# Patient Record
Sex: FEMALE | Race: WHITE | Employment: OTHER | URBAN - NONMETROPOLITAN AREA
[De-identification: names, ages, dates, MRNs, and addresses within clinical notes are randomized per-mention and may not be internally consistent; named-entity substitution may affect disease eponyms.]

---

## 2022-09-06 ENCOUNTER — HOSPITAL ENCOUNTER (OUTPATIENT)
Dept: PSYCHIATRY | Age: 57
Setting detail: THERAPIES SERIES
Discharge: HOME | End: 2022-09-06
Payer: MEDICARE

## 2022-09-06 PROCEDURE — 90791 PSYCH DIAGNOSTIC EVALUATION: CPT

## 2022-09-06 PROCEDURE — 80305 DRUG TEST PRSMV DIR OPT OBS: CPT

## 2022-09-06 RX ORDER — HYDROXYZINE PAMOATE 25 MG/1
25 CAPSULE ORAL 4 TIMES DAILY
COMMUNITY

## 2022-09-06 RX ORDER — FLUOXETINE HYDROCHLORIDE 20 MG/5ML
LIQUID ORAL DAILY
COMMUNITY

## 2022-09-06 ASSESSMENT — ANXIETY QUESTIONNAIRES
3. WORRYING TOO MUCH ABOUT DIFFERENT THINGS: 0
1. FEELING NERVOUS, ANXIOUS, OR ON EDGE: 0
GAD7 TOTAL SCORE: 1
2. NOT BEING ABLE TO STOP OR CONTROL WORRYING: 0
5. BEING SO RESTLESS THAT IT IS HARD TO SIT STILL: 1
6. BECOMING EASILY ANNOYED OR IRRITABLE: 0
7. FEELING AFRAID AS IF SOMETHING AWFUL MIGHT HAPPEN: 0
IF YOU CHECKED OFF ANY PROBLEMS ON THIS QUESTIONNAIRE, HOW DIFFICULT HAVE THESE PROBLEMS MADE IT FOR YOU TO DO YOUR WORK, TAKE CARE OF THINGS AT HOME, OR GET ALONG WITH OTHER PEOPLE: NOT DIFFICULT AT ALL
4. TROUBLE RELAXING: 0

## 2022-09-06 NOTE — PROGRESS NOTES
[facility] EMRE                Progress Note    [] Juliocesar  [x] Hilda Floyd                    Patient Name: Venessa Eddy   : 1965     Case # :  [id number]  Therapist: Puma Rivas        Objective/Service/Time:    Asmt 1 hour, UDS . 28  S- Client reports former [facility] resident, referred by Pee, recently discharged from [facility] due to Possession issues due to Meth/Amphetamine, denies current abuse. Client history of Alcohol abuse denies use for 7 years (see in Epic). O- Client cooperative reported Heart disease and a Catarak.   Denies any S/I admits generalized anxiety has medication  A- Client signed DAVID, gave Urine Screen and started Assessment  P- Client attends Pee, agrees to level 1 was given information on GT, treatment planning                   Kari Davis MA, GAUDENCIO Bartholomew, MAC 22, 2:45 PM

## 2022-09-06 NOTE — PROGRESS NOTES
[address] [place] [address] Urinalysis Laboratory Testing and Medical History Physician Order       Location: [] [place] [x] Bobbye Guadalajara D. Henrene Apgar, MD., [id number], Medical Director of [facility] SURGICAL [facility] Director orders for [address] [place] [address] clinical therapists to collect an urine sample from the below patient,  and medical order for placement in level of care documented on  [facility] 157 scanned order. Client: Nolvia Del Rosario   : 1965   Case# [id number]    Urine sample will be collected following the collections guidelines provided on Clinical Reference Laboratory [facility] [place]) custody form, and completion of the [facility] Non-Federal chain of custody drug screening form. During the course of treatment, randomly a urine sample will be collected, at a minimum of one time a month, more frequently as needed, as part of the clinical outpatient alcohol and drug treatment program at [address] [place] [address]. Medical care recommendation for clients experiencing/reporting  medical concerns, who do not have a family physician and willing to attend  medical care will be assisted in seeking medical care. Clinical providers will refer clients to local family physicians practices as part of the  clients treatment plan and assist the client in gaining access to an appointment. Release of information will be requested to support the  clients seeking medical care. Summary of Medical History  Prior to Admission medications    Medication Sig Start Date End Date Taking? Authorizing Provider   FLUoxetine (PROZAC) 20 MG/5ML solution Take by mouth daily   Yes Historical Provider, MD   hydrOXYzine pamoate (VISTARIL) 25 MG capsule Take 25 mg by mouth 4 times daily   Yes Historical Provider, MD     History reviewed. No pertinent surgical history. Past Medical History:   Diagnosis Date    Heart disease      There are no problems to display for this patient.       Jn [facility] [place]  2022/1:58 PM

## 2022-09-06 NOTE — PROGRESS NOTES
Left message for patient to call back. [facility]                     CLINICAL DIAGNOSIS SUMMARY    Location: [] [place] [x] [place]                   Patient Name: Esteban Whitney   : 1965     Case # :  [id number]  Therapist: Isaac Fish [facility]      Identifying information:  Esteban Whitney / 1965         WSF, 2 Adult children, moved to \[facility]\""    2. Substance use history:  F10.20 Other and unspecified alcohol dependence/unspecified drinking behavior and F15.20 Amphetamine and other psychostimulant dependence-unspecified use       Alcohol abuse, Teenage to 7 Years ago, Vodka high tolerance, till impariment, 5 OVIS, 2champ, [place], Andre 5 total.       Methamphetamine 1 time a month ,     3. Consequences of substance use: (personal, inter-personal, legal, occupational, medical, nutritional,       Leisure, spiritual, etc.)                5 ENMANUEL's,  3 Drug possessions at [facility] [employer], last ,  [employer] in        4. Co-existing problems;  (mental health, psychiatric, previous treatment programs, family       Problems, social, educational, etc.)         Generalized Anxiety,  at [facility], Dr. El Cooper     5. Treatment needs, barriers to treatment, impact of disease on life:       Client has health problesm    Summary of Medical History:  Prior to Admission medications    Medication Sig Start Date End Date Taking? Authorizing Provider   FLUoxetine (PROZAC) 20 MG/5ML solution Take by mouth daily   Yes Historical Provider, MD   hydrOXYzine pamoate (VISTARIL) 25 MG capsule Take 25 mg by mouth 4 times daily   Yes Historical Provider, MD     History reviewed. No pertinent surgical history. Past Medical History:   Diagnosis Date    Heart disease      There are no problems to display for this patient. 6. Level of Care Determination:      1 Outpatient Services   7. Treatment available      __x__yes   _____no         8.   Name of program referred:    ___x_[facility], ____[facility],       _______other/identify     Electronically signed by Pia Orourke LPC on 9/6/2022 at 2:29 PM

## 2022-09-13 ENCOUNTER — HOSPITAL ENCOUNTER (OUTPATIENT)
Dept: PSYCHIATRY | Age: 57
Setting detail: THERAPIES SERIES
Discharge: HOME | End: 2022-09-13
Payer: MEDICARE

## 2022-09-13 PROCEDURE — 90834 PSYTX W PT 45 MINUTES: CPT

## 2022-09-13 NOTE — PROGRESS NOTES
[facility] TREATMENT PLAN      Location: [] [place] [x] [place]    Treatment plan: Initial    Strengths: [place] Mother and grandmother, gardening    Weakness/Limitations: Alcohol, Drug Abuse, and Legal issues    Service/Frequency/Duration: Individual 1wk and Group assigned 1wk Random UDS in 90 days    Diagnosis: F10.20 Other and unspecified alcohol dependence/unspecified drinking behavior and F15.20 Amphetamine and other psychostimulant dependence-unspecified use    Level of Care: 1 Outpatient Services    Problem:  Client history of Alcohol Abuse, 5 ENMANUEL's (sober 7 years), Abused Methamphetamines, Treatment at [facility] in 2021    Goal: Client maintain sober living in the [facility] [place] [address] in 90 days  Objectives:   1) Discussed 2 triggers and 2 cravings to abuse drugs and alcohol in 90 days  Evaluation Date: 12/13/22 Code: C Continue TBD  2) Client discussed 2 benefits of attending the Recovery Zone in 90 days  Evaluation Date: 12/13/22 Code: C Continue TBD      2. Problem: Client reports challenges with Generalized anxiety, peer pressures to use, and finding food and clothing   Goal: Client develop resources to cope with emotions, social pressure and finding food/clothing in 90 days   Objectives:   1) Client report 2 benefits of supports from the [employer]  and the Jaimie program in 90 days  Evaluation Date: 12/13/22 Code: C Continue TBD   2) Client report 2 benefits of , [facility] days  Evaluation Date: 12/13/22 Code: C Continue TBD   3) Client report 2 coping tools to manage emotions in 90 days  Evaluation Date: 12/13/22 Code: C Continue TBD     3. Problem: Client previous 3 drug possession charges in [facility] prior to placement at [facility] in 2021   Goal: Meet the expectations of J-Rig Program and [facility] [place]. In 90 days    Objectives:   1) Client reports 2 benefits of the J-Rig Program and [facility] ([facility]).  In 90 days Evaluation Date: 12/13/22 Code: C Continue TBD      Defer: Explore part time work    Discharge Plan/Instructions: Client's goal maintain family relationships, drug free living, increase activities with crafts, gardening and the recovery zone. Ventura Hope / 1965 has participated in the treatment plan development outlined above on 9/13/2022.      Trinidad [facility]  9/13/2022/2:17 PM

## 2022-09-20 ENCOUNTER — HOSPITAL ENCOUNTER (OUTPATIENT)
Dept: PSYCHIATRY | Age: 57
Setting detail: THERAPIES SERIES
Discharge: HOME | End: 2022-09-20
Payer: MEDICARE

## 2022-09-20 PROCEDURE — 90834 PSYTX W PT 45 MINUTES: CPT

## 2022-09-20 NOTE — PROGRESS NOTES
IT                                                         [facility] EMRE                Progress Note    [] Juliocesar  [x] Kirby Puls                    Patient Name: Filiberto Wilson   : 1965     Case # :  [id number]  Therapist: Bari Estevez [facility]        Objective/Service/Time:    IT 45 minutes  Topic:  maintaining sober living  coping tools per Help guide 3/1 correspondence with her PO and Pee  S- Client reported brother in law  unexpected. Client has been with her sister and her family. Client connecting with a friend name sis. Client interested in cooking, working out, working outside and connecting with her grandchildren. O- Client oriented, focused and engaged in the session  A- Client and this writer discussed resources such as emotional intelligence on [employer], Recovery Zone and the [facility]. P- Client meeting with Pee, called her PO, going to walk and cook. Client enjoys reading and wants to work out.                   Bari Estevez MA, JORI, LSW, MAC 22, 3:03 PM

## 2022-09-20 NOTE — PROGRESS NOTES
[facility]                Progress Note    [] Juliocesar  [x] [place]                    Patient Name: Ventura Hope   : 1965     Case # :  [id number]  Therapist: Trinidad [facility]        Objective/Service/Time:    Jake Licona MA, LPC, LSW, MAC 22, 2:01 PM

## 2022-09-20 NOTE — PROGRESS NOTES
[facility]                Progress Note    [] Juliocesar  [x] [place]                    Patient Name: Ellie Carmona   : 1965     Case # :  [id number]  Therapist: Juan Jose Mccartney [facility]        Objective/Service/Time:    Anita Waters MA, JORI, PETRAW, MAC 22, 2:29 PM

## 2022-09-22 ENCOUNTER — HOSPITAL ENCOUNTER (OUTPATIENT)
Dept: PSYCHIATRY | Age: 57
Setting detail: THERAPIES SERIES
Discharge: HOME | End: 2022-09-22
Payer: MEDICARE

## 2022-09-22 PROCEDURE — 90853 GROUP PSYCHOTHERAPY: CPT

## 2022-09-27 ENCOUNTER — HOSPITAL ENCOUNTER (OUTPATIENT)
Dept: PSYCHIATRY | Age: 57
Setting detail: THERAPIES SERIES
Discharge: HOME | End: 2022-09-27
Payer: MEDICARE

## 2022-09-27 PROCEDURE — 90834 PSYTX W PT 45 MINUTES: CPT

## 2022-09-27 NOTE — PROGRESS NOTES
[facility]                Progress Note    [] [place]  [x] [place]                    Patient Name: Leanna Mcgowan   : 1965     Case # :  [id number]  [facility]        Objective/Service/Time:    IT  60Minutes  requesting Tele-health due to illness  The Client stated their name and soc #. Client agreed to Telehealth and reported in a Confidential setting. Client and counselor will call back if there is a disconnect. Client has the crisis # for  needs. S- Client working on plan , Client was lonely and went to a party and abused Meth on Friday and Sat. Client called her PO, and J-Rig & shared. Client reports triggers are anxiety, Client used alcohol and prescribed Xanax in the past. Client reports began Meth to calm her anxiety. [facility]. Client reports history of fear, but still goes to stores. Client exception to the rule her friend's house and her sons. 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 P- Client follow up with Medical, therapy on , using strengths and diversion.                   Cate Eldridge MA, LPC, LSW, MAC 22, 1:58 PM

## 2022-09-29 ENCOUNTER — HOSPITAL ENCOUNTER (OUTPATIENT)
Dept: PSYCHIATRY | Age: 57
Setting detail: THERAPIES SERIES
Discharge: HOME | End: 2022-09-29
Payer: MEDICARE

## 2022-09-29 NOTE — GROUP NOTE
[facility] Group Therapy Note      9/29/2022    Location:  [facility]    Clients Presents: [phone]    Clients Absent: [phone]    Length of session: 1.5 hours    Group Note: OP    Group Type: Co-Ed    New members were welcomed and introduced. Norms and expectations of group were discussed.     Content: client discussed personal anchors for sober living, serotonin syndrom and abusing Nyquil, Client supported each other during the session    Puma Son  9/29/2022 12:50 PM    Co-Therapist: N/A      [facility] Individual Group Progress Note    Sonya Husbands  1965  9/29/2022    Notes on Client Progress in Group    Reason for Absence: CXL no ride    Puma Son  9/29/2022 12:56 PM    Co-Therapist: N/A

## 2022-10-04 ENCOUNTER — HOSPITAL ENCOUNTER (OUTPATIENT)
Dept: PSYCHIATRY | Age: 57
Setting detail: THERAPIES SERIES
Discharge: HOME | End: 2022-10-04
Payer: MEDICARE

## 2022-10-04 PROCEDURE — 90834 PSYTX W PT 45 MINUTES: CPT

## 2022-10-04 NOTE — PROGRESS NOTES
[facility]                Progress Note    [] Juliocesar  [x] Mike Houston                    Patient Name: Lambert Weston   : 1965     Case # :  [id number]  Therapist: Martin Hook [facility]        Objective/Service/Time:    IT 1.0  Topic: coping skills, [employer], utilizing her strengths  S- Client reports, tired, has not used 1 week. Client planning to discuss anxiety with [facility], and planning a time with her son. O- Client full range, sad to bright  A- Client this writer focused on her strengths and what the client can do, bake, Y, serve, and attend recovery zone. 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 P- Client use resources as noted above.                    Martin Hook MA, JORI, LSW, MAC 10/04/22, 2:42 PM

## 2022-10-06 ENCOUNTER — HOSPITAL ENCOUNTER (OUTPATIENT)
Dept: PSYCHIATRY | Age: 57
Setting detail: THERAPIES SERIES
Discharge: HOME | End: 2022-10-06
Payer: MEDICARE

## 2022-10-06 PROCEDURE — 90853 GROUP PSYCHOTHERAPY: CPT

## 2022-10-06 NOTE — GROUP NOTE
[facility] Group Therapy Note      10/6/2022    Location:  [employer]    Clients Presents: [phone]    Clients Absent: [phone]    Length of session: 1.5 hours    Group Note: OP    Group Type: Co-Ed    New members were welcomed and introduced. Norms and expectations of group were discussed. Content: Client responded and discussed their Values and strengths as tools to motivate, to focus and to enable sober living. Client's viewed and discussed Tools video from [employer] and Father Brittny Perez step 3. Clients were advised of varying resources to gain sober living: AA and [employer] resources. Morro [facility]  10/6/2022 1:11 PM    Co-Therapist: N/A      [facility] Individual Group Progress Note    Doyle Childers  1965  10/6/2022    Notes on Client Progress in Group    Client shared about her lapse and use. Client gained peer support. Client using vitamins, scheduled for an assessment with ANUJ Li, spending time with friends and Family. Client hopes to go to the [facility], spend time with grandchildren and find things to do with her time.     Morro [facility]  10/6/2022 1:23 PM    Co-Therapist: N/A

## 2022-10-11 ENCOUNTER — HOSPITAL ENCOUNTER (OUTPATIENT)
Dept: PSYCHIATRY | Age: 57
Setting detail: THERAPIES SERIES
Discharge: HOME | End: 2022-10-11
Payer: MEDICARE

## 2022-10-11 PROCEDURE — 80305 DRUG TEST PRSMV DIR OPT OBS: CPT

## 2022-10-11 PROCEDURE — 90834 PSYTX W PT 45 MINUTES: CPT

## 2022-10-11 NOTE — PROGRESS NOTES
[facility]                Progress Note    [] Juliocesar  [x] Elton Sawant                    Patient Name: Leeroy Bland   : 1965     Case # :  [id number]  [facility]        Objective/Service/Time:    IT      1.0 UDS . 28  S- Client reports, doing much better, no anxiety, no use, Client spending time with grandchildren, and friend. Client planning to go hiking. O- Client focused, oriented, and bright affect, advised of new PO, sign DAVID  A- Client and this writer discussed her improved mental health and motivation. Client focused willingly and wants to increase her activities. P- Client aware of next IT in 2 Weeks and GT on 10/20/22.                       Jennifer Dickinson MA, JORI, LSW, MAC 10/11/22, 1:47 PM

## 2022-10-20 ENCOUNTER — HOSPITAL ENCOUNTER (OUTPATIENT)
Dept: PSYCHIATRY | Age: 57
Setting detail: THERAPIES SERIES
Discharge: HOME | End: 2022-10-20
Payer: MEDICARE

## 2022-10-20 ENCOUNTER — HOSPITAL ENCOUNTER (OUTPATIENT)
Dept: PSYCHIATRY | Age: 57
Setting detail: THERAPIES SERIES
End: 2022-10-20
Payer: MEDICARE

## 2022-10-20 PROCEDURE — 90853 GROUP PSYCHOTHERAPY: CPT | Performed by: COUNSELOR

## 2022-10-20 NOTE — GROUP NOTE
612 [facility] Group Therapy Note      10/20/2022    Location:  [facility]    Clients Presents: [phone], [address] [facility] [address] [facility], 5089    Clients Absent: [phone], [phone], [phone]    Length of session: 1.5 hours    Group Note: OP    Group Type: Co-Ed    New members were welcomed and introduced. Norms and expectations of group were discussed. Content: Group members shared their check-in sheet, reviewed history of the situation that brought them into treatment, length of time of their sobriety. Completed Positive Sobert support worksheet and reviewed with group     RICCO Newell-MITCH  14/60/9766 11:53 AM    Co-Therapist: N/A      [facility] Individual Group Progress Note    Dioni Augustine  1965  10/20/2022    Notes on Client Progress in Group    Reports about a month of sobriety with a recent slip.   States avoiding all using friends, block from social media, has several family members and close friends for sober support      Lev Newell  95/63/3498 12:07 PM    Co-Therapist: N/A

## 2022-10-25 ENCOUNTER — HOSPITAL ENCOUNTER (OUTPATIENT)
Dept: PSYCHIATRY | Age: 57
Setting detail: THERAPIES SERIES
Discharge: HOME | End: 2022-10-25
Payer: MEDICARE

## 2022-10-25 ENCOUNTER — HOSPITAL ENCOUNTER (OUTPATIENT)
Dept: PSYCHIATRY | Age: 57
Setting detail: THERAPIES SERIES
End: 2022-10-25
Payer: MEDICARE

## 2022-10-25 PROCEDURE — 90832 PSYTX W PT 30 MINUTES: CPT | Performed by: COUNSELOR

## 2022-10-25 NOTE — PROGRESS NOTES
[facility] TREATMENT PLAN      Location: [] [place] [x] Christ Preciado    Treatment plan: Initial    Strengths: Bri[place] Mother and grandmother, gardening    Weakness/Limitations: Alcohol, Drug Abuse, and Legal issues    Service/Frequency/Duration: Individual 1wk and Group assigned 1wk Random UDS in 90 days    Diagnosis: F10.20 Other and unspecified alcohol dependence/unspecified drinking behavior and F15.20 Amphetamine and other psychostimulant dependence-unspecified use    Level of Care: 1 Outpatient Services    Problem:  Client history of Alcohol Abuse, 5 ENMANUEL's (sober 7 years), Abused Methamphetamines, Treatment at [facility] in 2021    Goal: Client maintain sober living in the [place] [address] in 90 days  Objectives:   1) Discussed 2 triggers and 2 cravings to abuse drugs and alcohol in 90 days  Evaluation Date: 12/13/22 Code: C Continue TBD            ACHIEVED  10-25-22   Using people who have drugs, past thought could use just once, couldn't report any other triggers, reports             Using because every once else was  2) Client discussed 2 benefits of attending the Recovery Zone in 90 days  Evaluation Date: 12/13/22 Code: C Continue TBD           * Reports not interested in Recovery Zone, past years attended AA, close friend will go to [employer] on occasion and she will attend      2.     Problem: Client reports challenges with Generalized anxiety, peer pressures to use, and finding food and clothing   Goal: Client develop resources to cope with emotions, social pressure and finding food/clothing in 90 days   Objectives:   1) Client report 2 benefits of supports from the [facility] and the Iliana Short program in 90 days  Evaluation Date: 12/13/22 Code: C Continue TBD   2) Client report 2 benefits of , Medical support from Dr. Beti Duckworth in 90 days  Evaluation Date: 12/13/22 Code: C Continue TBD               3) Client report 2 coping tools to manage emotions in 90 days  Evaluation Date: 12/13/22 Code: C Continue TBD     3. Problem: Client previous 3 drug possession charges in [facility] prior to placement at [facility] in 2021   Goal: Meet the expectations of JRig Program and [facility] [place] [place]. In 90 days    Objectives:   1) Client reports 2 benefits of the J-Rig Program and [facility] In 90 days  Evaluation Date: 12/13/22 Code: C Continue TBD      Defer: Explore part time work    Discharge Plan/Instructions: Client's goal maintain family relationships, drug free living, increase activities with crafts, gardening and the recovery zone. Annika Cali / 1965 has participated in the treatment plan development outlined above on 10/25/2022.      RICCO Millan-MITCH  57/16/1316/1:21 PM

## 2022-10-25 NOTE — PROGRESS NOTES
612 [facility]        Individual  Progress Note    Location: [] [place] [x] Hollie Wu                   Patient Name: Taran Atkins   : 1965     Case # :  [id number]  Therapist: FLETCHER Erwin        Objective/Service/Time: kept 30 minutes    S  Client reports doing well, having no triggers, no stresses, everything going well  O verbal, open  A Client reports not attending Recovery Zone, states not a group person, finds support in family and a  Close friend. 1 ob 1  reports limited triggers, states being around other people using drugs is her primary trigger, and couldn't  Report any other ones, states avoiding all using friends, and no alcohol use for many years.     Will have cataract eye surgery  to continue treatment next week with Paul Nash therapist         Electronically signed by FLETCHER Erwin, FLETCHER HSIEH,  on  at 2:38 PM

## 2022-10-27 ENCOUNTER — HOSPITAL ENCOUNTER (OUTPATIENT)
Dept: PSYCHIATRY | Age: 57
Setting detail: THERAPIES SERIES
Discharge: HOME | End: 2022-10-27
Payer: MEDICARE

## 2022-10-27 ENCOUNTER — HOSPITAL ENCOUNTER (OUTPATIENT)
Dept: PSYCHIATRY | Age: 57
Setting detail: THERAPIES SERIES
End: 2022-10-27
Payer: MEDICARE

## 2022-10-27 NOTE — GROUP NOTE
[facility] Group Therapy Note      10/27/2022    Location:  [place] Manri[place]    Clients Presents: [phone], [phone]    Clients Absent: 25 [facility], [address] [facility], [address] [place] [address], 92 [facility], 5094, 5113, 6895, 6613    Length of session: 1.5 hours    Group Note: OP    Group Type: Co-Ed    New members were welcomed and introduced. Norms and expectations of group were discussed. Content: Group members discussed their check-in sheet, shared history of the situation that brought them into treatment and discussed positive supports and alternative activities. Discussed nicotine addiction and tools for cessation.       [place], [place]  10/27/2022 2:18 PM    Co-Therapist: N/A      [facility] Individual Group Progress Note    Margarita Calloway  1965  10/27/2022    Notes on Client Progress in Group    Reason for Absence: [address] [place] [address]  10/27/2022 2:35 PM    Co-Therapist: N/A

## 2022-11-01 ENCOUNTER — HOSPITAL ENCOUNTER (OUTPATIENT)
Dept: PSYCHIATRY | Age: 57
Setting detail: THERAPIES SERIES
Discharge: HOME | End: 2022-11-01
Payer: MEDICARE

## 2022-11-01 PROCEDURE — 90832 PSYTX W PT 30 MINUTES: CPT

## 2022-11-01 NOTE — PROGRESS NOTES
[facility]                Progress Note    [] [place]  [x] [place]                    Patient Name: Keven Salmeron   : 1965     Case # :  [id number]  Therapist: Juan Luis Barker [facility]        Objective/Service/Time:    IT Telehealth , 30 minutes,  illness was in contact with friends who have COVID The Client stated their name and soc #. Client agreed to Telehealth and reported in a Confidential setting. Client and counselor will call back if there is a disconnect. Client has the crisis # for  needs. S- Client reports,   doing well with sober living and hanging with peers who are supportive & sober. Client fighting an illness and did not want to \"spread the illness\". Client has attended the Y, confusion on using the passes. Client checks in with J-Rig  O- Client focused, cooperative and motivated per her report  A- Client and this writer discussed her motivation, the benefits of recovery zone and the [employer].   P- Client plans to attend GT, IT, and J-Rig                  Juan Luis Barker MA, [facility], GAUDENCIO, MAC 22, 1:59 PM

## 2022-11-03 ENCOUNTER — HOSPITAL ENCOUNTER (OUTPATIENT)
Dept: PSYCHIATRY | Age: 57
Setting detail: THERAPIES SERIES
Discharge: HOME | End: 2022-11-03
Payer: MEDICARE

## 2022-11-03 PROCEDURE — 90853 GROUP PSYCHOTHERAPY: CPT

## 2022-11-03 NOTE — GROUP NOTE
612 [facility] Group Therapy Note      11/3/2022    Location:  [facility]    Clients Presents: [phone]    Clients Absent: [phone]    Length of session: 1.5 hours    Group Note: OP    Group Type: Co-Ed    New members were welcomed and introduced. Norms and expectations of group were discussed. Content: Client's shared their strengths in reference to sober living, Client watched and discussed Deborra Saucer Sober story.     [facility]  11/3/2022 12:14 PM    Co-Therapist: N/A      [facility] Individual Group Progress Note    Felylatoya Atkins  1965  11/3/2022    Notes on Client Progress in Group    Client shared freely, motivated and focused told her story    [facility]  11/3/2022 12:24 PM    Co-Therapist: N/A

## 2022-11-08 ENCOUNTER — ANESTHESIA EVENT (OUTPATIENT)
Dept: OPERATING ROOM | Age: 57
End: 2022-11-08
Payer: MEDICARE

## 2022-11-08 NOTE — ANESTHESIA PRE PROCEDURE
Department of Anesthesiology  Preprocedure Note       Name:  Annika Cali   Age:  59 y.o.  :  1965                                          MRN:  [id number]         Date:  2022      Surgeon: Julian Fuller):  Jayla Amaral MD    Procedure: Procedure(s):  RIGHT EYE CATARACT EXTRACTION    Medications prior to admission:   Prior to Admission medications    Medication Sig Start Date End Date Taking? Authorizing Provider   FLUoxetine (PROZAC) 20 MG/5ML solution Take by mouth daily    Historical Provider, MD   hydrOXYzine pamoate (VISTARIL) 25 MG capsule Take 25 mg by mouth 4 times daily    Historical Provider, MD       Current medications:    No current facility-administered medications for this encounter. Current Outpatient Medications   Medication Sig Dispense Refill    FLUoxetine (PROZAC) 20 MG/5ML solution Take by mouth daily      hydrOXYzine pamoate (VISTARIL) 25 MG capsule Take 25 mg by mouth 4 times daily         Allergies:  No Known Allergies    Problem List:  There is no problem list on file for this patient. Past Medical History:        Diagnosis Date    Heart disease        Past Surgical History:  History reviewed. No pertinent surgical history. Social History:    Social History     Tobacco Use    Smoking status: Not on file    Smokeless tobacco: Not on file   Substance Use Topics    Alcohol use: Not on file                                Counseling given: Not Answered      Vital Signs (Current): There were no vitals filed for this visit.                                            BP Readings from Last 3 Encounters:   No data found for BP       NPO Status:                                                                                 BMI:   Wt Readings from Last 3 Encounters:   No data found for Wt     There is no height or weight on file to calculate BMI.    CBC: No results found for: WBC, RBC, HGB, HCT, MCV, RDW, PLT    CMP: No results found for: NA, K, CL, CO2, BUN, CREATININE, GFRAA, AGRATIO, LABGLOM, GLUCOSE, GLU, PROT, CALCIUM, BILITOT, ALKPHOS, AST, ALT    POC Tests: No results for input(s): POCGLU, POCNA, POCK, POCCL, POCBUN, POCHEMO, POCHCT in the last 72 hours. Coags: No results found for: PROTIME, INR, APTT    HCG (If Applicable): No results found for: PREGTESTUR, PREGSERUM, HCG, HCGQUANT     ABGs: No results found for: PHART, PO2ART, JBA4UIB, EAF3AZR, BEART, T2IBMYUH     Type & Screen (If Applicable):  No results found for: LABABO, LABRH    Drug/Infectious Status (If Applicable):  No results found for: HIV, HEPCAB    COVID-19 Screening (If Applicable): No results found for: COVID19        Anesthesia Evaluation  Patient summary reviewed no history of anesthetic complications:   Airway: Mallampati: II  TM distance: >3 FB   Neck ROM: full  Mouth opening: > = 3 FB   Dental:      Comment: Upper partial     Pulmonary:normal exam    (+) current smoker                           Cardiovascular:                      Neuro/Psych:   (+) psychiatric history:depression/anxiety             GI/Hepatic/Renal:        (-) GERD       Endo/Other: Negative Endo/Other ROS                    Abdominal:             Vascular: Other Findings:           Anesthesia Plan      MAC     ASA 2     (Chart review 11/8/22)  Induction: intravenous. Anesthetic plan and risks discussed with patient.                         FABIAN Hayward CRNA   11/8/2022

## 2022-11-10 ENCOUNTER — HOSPITAL ENCOUNTER (OUTPATIENT)
Dept: PSYCHIATRY | Age: 57
Setting detail: THERAPIES SERIES
Discharge: HOME | End: 2022-11-10
Payer: MEDICARE

## 2022-11-10 PROCEDURE — 90853 GROUP PSYCHOTHERAPY: CPT

## 2022-11-10 NOTE — GROUP NOTE
[facility] Group Therapy Note      11/10/2022    Location:  [facility]    Clients Presents: [phone]    Clients Absent: [phone]    Length of session: 1.5 hours    Group Note: OP    Group Type: Co-Ed    New members were welcomed and introduced. Norms and expectations of group were discussed. Content: Client's discussed boundaries, transitions, coping with idle time, finding work and how they stay sober     Izabela Bajwa [facility]  11/10/2022 12:40 PM    Co-Therapist: [facility] Progress Note    Gloria Jang  1965  11/10/2022    Notes on Client Progress in Group    Client bright, rosser, working on wellness, gave feedback to peer about her sober journey.     Izabela Bajwa [facility]  11/10/2022 12:50 PM    Co-Therapist: N/A

## 2022-11-15 ENCOUNTER — HOSPITAL ENCOUNTER (OUTPATIENT)
Dept: PSYCHIATRY | Age: 57
Setting detail: THERAPIES SERIES
Discharge: HOME | End: 2022-11-15
Payer: MEDICARE

## 2022-11-15 PROCEDURE — 90834 PSYTX W PT 45 MINUTES: CPT

## 2022-11-15 PROCEDURE — 80305 DRUG TEST PRSMV DIR OPT OBS: CPT

## 2022-11-15 NOTE — PROGRESS NOTES
Surgery Date:  11/18/22                                  Arrive at: 0700  Surgery time: 0900     If your arrival time is before 7 AM come in the emergency room entrance. If after 7 AM come in the main entrance. Two visitors may accompany you to the hospital.              1. Do not eat or drink anything after midnight - unless instructed by your doctor prior to surgery. This includes                  no water, chewing gum or mints. 2. Follow your directions as prescribed by the doctor for your procedure and medications. Take the following medications with a small sip of water the morning of: none               3. Check with your Doctor regarding stopping Plavix, Coumadin, Lovenox, Effient, Pradaxa, Xarelto, Fragmin or other blood thinners and                  follow their instructions. 4. Do not smoke and do not drink any alcoholic beverages 24 hours prior to surgery. 5. You may brush your teeth and gargle the morning of surgery. DO NOT SWALLOW WATER  6. Please wear simple, loose fitting clothing to the hospital.  [place] not bring valuables (money, credit cards, checkbooks, etc.) Do not wear any                  makeup (including no eye makeup) or nail polish on your fingers or toes. 7.  DO NOT wear any jewelry or piercings on day of surgery. All body piercing jewelry must be removed. 8.  Take a shower the night before or morning of your procedure, do not apply any lotion, oil or powder. 9. If you have dentures, they will be removed before going to the OR; we will provide you a container. If you wear contact lenses or glasses,                 they will be removed; please bring a case for them. 10. If you  have a Living Will and Durable Power of  for Healthcare, please bring in a copy.            11. Please bring picture ID,  insurance card, paperwork from the doctors office    (H & P, Consent, & card for implantable devices). 12. You MUST make arrangements for a responsible adult to take you home after your surgery and be able to check on you every couple                  hours for the day. You will not be allowed to leave alone or drive yourself home. It is strongly suggested someone stay with you the first 24                  hrs. Your surgery will be cancelled if you do not have a ride home. Please call [phone] with any questions.

## 2022-11-17 ENCOUNTER — HOSPITAL ENCOUNTER (OUTPATIENT)
Dept: PSYCHIATRY | Age: 57
Setting detail: THERAPIES SERIES
Discharge: HOME | End: 2022-11-17
Payer: MEDICARE

## 2022-11-17 PROCEDURE — 90853 GROUP PSYCHOTHERAPY: CPT

## 2022-11-17 NOTE — GROUP NOTE
[facility] Group Therapy Note      11/17/2022    Location:  [facility]    Clients Presents: [phone]     Clients Absent: [phone]    Length of session: 1.5 hours    Group Note: OP    Group Type: Co-Ed    New members were welcomed and introduced. Norms and expectations of group were discussed. Content: Client's discussed [employer]  Backsliding Video and DEADS handout coping with urges. Client gave peer feedback to a member who is in crisis . [facility]  11/17/2022 11:53 AM    Co-Therapist: N/A      [facility] Progress Note    Mary Alice Kam  1965  11/17/2022    Notes on Client Progress in Group    Client excited about her eye surgery. Client shared how she battled lapses and setting boundaries with others. Client gave feedback to group member.     [facility]  11/17/2022 12:04 PM    Co-Therapist: N/A

## 2022-11-18 ENCOUNTER — HOSPITAL ENCOUNTER (OUTPATIENT)
Age: 57
Setting detail: OUTPATIENT SURGERY
Discharge: HOME | End: 2022-11-18
Attending: OPHTHALMOLOGY | Admitting: OPHTHALMOLOGY
Payer: MEDICARE

## 2022-11-18 ENCOUNTER — ANESTHESIA (OUTPATIENT)
Dept: OPERATING ROOM | Age: 57
End: 2022-11-18
Payer: MEDICARE

## 2022-11-18 VITALS
BODY MASS INDEX: 29.4 KG/M2 | SYSTOLIC BLOOD PRESSURE: 113 MMHG | TEMPERATURE: 98.2 F | RESPIRATION RATE: 16 BRPM | HEIGHT: 68 IN | OXYGEN SATURATION: 100 % | WEIGHT: 194 LBS | HEART RATE: 72 BPM | DIASTOLIC BLOOD PRESSURE: 72 MMHG

## 2022-11-18 PROBLEM — H25.21 MORGAGNIAN AGE-RELATED CATARACT OF RIGHT EYE: Status: ACTIVE | Noted: 2022-11-18

## 2022-11-18 PROCEDURE — 7100000010 HC PHASE II RECOVERY - FIRST 15 MIN: Performed by: OPHTHALMOLOGY

## 2022-11-18 PROCEDURE — 7100000011 HC PHASE II RECOVERY - ADDTL 15 MIN: Performed by: OPHTHALMOLOGY

## 2022-11-18 PROCEDURE — 2580000003 HC RX 258: Performed by: OPHTHALMOLOGY

## 2022-11-18 PROCEDURE — 6360000002 HC RX W HCPCS: Performed by: NURSE ANESTHETIST, CERTIFIED REGISTERED

## 2022-11-18 PROCEDURE — 3600000004 HC SURGERY LEVEL 4 BASE: Performed by: OPHTHALMOLOGY

## 2022-11-18 PROCEDURE — 2500000003 HC RX 250 WO HCPCS: Performed by: OPHTHALMOLOGY

## 2022-11-18 PROCEDURE — 6370000000 HC RX 637 (ALT 250 FOR IP): Performed by: OPHTHALMOLOGY

## 2022-11-18 PROCEDURE — 2709999900 HC NON-CHARGEABLE SUPPLY: Performed by: OPHTHALMOLOGY

## 2022-11-18 PROCEDURE — 3700000001 HC ADD 15 MINUTES (ANESTHESIA): Performed by: OPHTHALMOLOGY

## 2022-11-18 PROCEDURE — 3600000014 HC SURGERY LEVEL 4 ADDTL 15MIN: Performed by: OPHTHALMOLOGY

## 2022-11-18 PROCEDURE — 3700000000 HC ANESTHESIA ATTENDED CARE: Performed by: OPHTHALMOLOGY

## 2022-11-18 PROCEDURE — 2500000003 HC RX 250 WO HCPCS: Performed by: NURSE ANESTHETIST, CERTIFIED REGISTERED

## 2022-11-18 PROCEDURE — V2632 POST CHMBR INTRAOCULAR LENS: HCPCS | Performed by: OPHTHALMOLOGY

## 2022-11-18 PROCEDURE — 6360000002 HC RX W HCPCS: Performed by: OPHTHALMOLOGY

## 2022-11-18 DEVICE — AKREOS AO MICRO INCISION LENS +0.00D
Type: IMPLANTABLE DEVICE | Site: EYE | Status: FUNCTIONAL
Brand: AKREOS® AO IOL

## 2022-11-18 RX ORDER — BETAMETHASONE SODIUM PHOSPHATE AND BETAMETHASONE ACETATE 3; 3 MG/ML; MG/ML
INJECTION, SUSPENSION INTRA-ARTICULAR; INTRALESIONAL; INTRAMUSCULAR; SOFT TISSUE
Status: COMPLETED | OUTPATIENT
Start: 2022-11-18 | End: 2022-11-18

## 2022-11-18 RX ORDER — NEOMYCIN SULFATE, POLYMYXIN B SULFATE, AND DEXAMETHASONE 3.5; 10000; 1 MG/G; [USP'U]/G; MG/G
OINTMENT OPHTHALMIC
Status: COMPLETED | OUTPATIENT
Start: 2022-11-18 | End: 2022-11-18

## 2022-11-18 RX ORDER — LIDOCAINE HYDROCHLORIDE 20 MG/ML
INJECTION, SOLUTION EPIDURAL; INFILTRATION; INTRACAUDAL; PERINEURAL
Status: COMPLETED | OUTPATIENT
Start: 2022-11-18 | End: 2022-11-18

## 2022-11-18 RX ORDER — CEFAZOLIN SODIUM 1 G/3ML
INJECTION, POWDER, FOR SOLUTION INTRAMUSCULAR; INTRAVENOUS
Status: COMPLETED | OUTPATIENT
Start: 2022-11-18 | End: 2022-11-18

## 2022-11-18 RX ORDER — TROPICAMIDE 10 MG/ML
1 SOLUTION/ DROPS OPHTHALMIC SEE ADMIN INSTRUCTIONS
Status: DISCONTINUED | OUTPATIENT
Start: 2022-11-18 | End: 2022-11-18 | Stop reason: HOSPADM

## 2022-11-18 RX ORDER — SODIUM CHLORIDE, SODIUM LACTATE, POTASSIUM CHLORIDE, CALCIUM CHLORIDE 600; 310; 30; 20 MG/100ML; MG/100ML; MG/100ML; MG/100ML
INJECTION, SOLUTION INTRAVENOUS CONTINUOUS PRN
Status: DISCONTINUED | OUTPATIENT
Start: 2022-11-18 | End: 2022-11-18 | Stop reason: SDUPTHER

## 2022-11-18 RX ORDER — MOXIFLOXACIN 5 MG/ML
SOLUTION/ DROPS OPHTHALMIC
Status: COMPLETED | OUTPATIENT
Start: 2022-11-18 | End: 2022-11-18

## 2022-11-18 RX ORDER — SODIUM CHLORIDE, SODIUM LACTATE, POTASSIUM CHLORIDE, CALCIUM CHLORIDE 600; 310; 30; 20 MG/100ML; MG/100ML; MG/100ML; MG/100ML
INJECTION, SOLUTION INTRAVENOUS CONTINUOUS
Status: DISCONTINUED | OUTPATIENT
Start: 2022-11-18 | End: 2022-11-18 | Stop reason: HOSPADM

## 2022-11-18 RX ORDER — TETRACAINE HYDROCHLORIDE 5 MG/ML
SOLUTION OPHTHALMIC
Status: COMPLETED | OUTPATIENT
Start: 2022-11-18 | End: 2022-11-18

## 2022-11-18 RX ORDER — LIDOCAINE HYDROCHLORIDE 20 MG/ML
INJECTION, SOLUTION EPIDURAL; INFILTRATION; INTRACAUDAL; PERINEURAL PRN
Status: DISCONTINUED | OUTPATIENT
Start: 2022-11-18 | End: 2022-11-18 | Stop reason: SDUPTHER

## 2022-11-18 RX ORDER — CYCLOPENTOLATE HYDROCHLORIDE 10 MG/ML
1 SOLUTION/ DROPS OPHTHALMIC SEE ADMIN INSTRUCTIONS
Status: DISCONTINUED | OUTPATIENT
Start: 2022-11-18 | End: 2022-11-18 | Stop reason: HOSPADM

## 2022-11-18 RX ORDER — PHENYLEPHRINE HCL 2.5 %
1 DROPS OPHTHALMIC (EYE) SEE ADMIN INSTRUCTIONS
Status: DISCONTINUED | OUTPATIENT
Start: 2022-11-18 | End: 2022-11-18 | Stop reason: HOSPADM

## 2022-11-18 RX ORDER — PROPOFOL 10 MG/ML
INJECTION, EMULSION INTRAVENOUS PRN
Status: DISCONTINUED | OUTPATIENT
Start: 2022-11-18 | End: 2022-11-18 | Stop reason: SDUPTHER

## 2022-11-18 RX ADMIN — CYCLOPENTOLATE HYDROCHLORIDE 1 DROP: 10 SOLUTION OPHTHALMIC at 07:57

## 2022-11-18 RX ADMIN — TROPICAMIDE 1 DROP: 10 SOLUTION/ DROPS OPHTHALMIC at 07:57

## 2022-11-18 RX ADMIN — PHENYLEPHRINE HYDROCHLORIDE 1 DROP: 25 SOLUTION/ DROPS OPHTHALMIC at 07:57

## 2022-11-18 RX ADMIN — TROPICAMIDE 1 DROP: 10 SOLUTION/ DROPS OPHTHALMIC at 08:12

## 2022-11-18 RX ADMIN — TROPICAMIDE 1 DROP: 10 SOLUTION/ DROPS OPHTHALMIC at 07:24

## 2022-11-18 RX ADMIN — SODIUM CHLORIDE, POTASSIUM CHLORIDE, SODIUM LACTATE AND CALCIUM CHLORIDE: 600; 310; 30; 20 INJECTION, SOLUTION INTRAVENOUS at 07:27

## 2022-11-18 RX ADMIN — SODIUM CHLORIDE, SODIUM LACTATE, POTASSIUM CHLORIDE, CALCIUM CHLORIDE: 600; 310; 30; 20 INJECTION, SOLUTION INTRAVENOUS at 09:07

## 2022-11-18 RX ADMIN — PHENYLEPHRINE HYDROCHLORIDE 1 DROP: 25 SOLUTION/ DROPS OPHTHALMIC at 08:12

## 2022-11-18 RX ADMIN — LIDOCAINE HYDROCHLORIDE 100 MG: 20 INJECTION, SOLUTION EPIDURAL; INFILTRATION; INTRACAUDAL; PERINEURAL at 09:06

## 2022-11-18 RX ADMIN — CYCLOPENTOLATE HYDROCHLORIDE 1 DROP: 10 SOLUTION OPHTHALMIC at 08:12

## 2022-11-18 RX ADMIN — PHENYLEPHRINE HYDROCHLORIDE 1 DROP: 25 SOLUTION/ DROPS OPHTHALMIC at 07:24

## 2022-11-18 RX ADMIN — PROPOFOL 70 MG: 10 INJECTION, EMULSION INTRAVENOUS at 09:06

## 2022-11-18 RX ADMIN — CYCLOPENTOLATE HYDROCHLORIDE 1 DROP: 10 SOLUTION OPHTHALMIC at 07:24

## 2022-11-18 ASSESSMENT — PAIN SCALES - GENERAL: PAINLEVEL_OUTOF10: 0

## 2022-11-18 ASSESSMENT — LIFESTYLE VARIABLES: SMOKING_STATUS: 1

## 2022-11-18 ASSESSMENT — PAIN - FUNCTIONAL ASSESSMENT: PAIN_FUNCTIONAL_ASSESSMENT: 0-10

## 2022-11-18 NOTE — OP NOTE
ACCOUNT NUMBER:  PATIENT NAME: Annika Cali  SURGEON:Gabino Berry MD   [address] [facility], [facility]    OPERATIVE REPORT          DATE OF OPERATION: _11/18/2022_  PREOPERTIVE DIAGNOIS:  MATURE CataractOD eye. POSTOPERTIVE DIAGNOSIS:  MATURE Cataract_OD eye. PROCEDURE:  Phacoemulsification of Cataract with intraocular lens      implant _OD eye.with use of VISION BLUE  ANESTHESIA:  Monitored anesthesia care with 2% Xylocaine in a       retrobullar injection. DESCRIPTION OF PROCEDURE:  Under adequate local anesthesia, a lid speculum was placed in the eye to expose the globe. Conjunctival flap was developed from 10 to 12 oclock position. Hemostasis was obtained using wet-field cautery. A 69-Bever blade was than utilized to make the initial scleral incision to a mid depth. Using a 55/22 blade a shelved incision was made into the anterior chamber. Paracentesis site was made at the 2 oclock position with a 75-Bever blade. VISION BLUE was placed into the anterior chamber to visualize the lens capsule. Healon was instilled and anterior capsuiorrhexis was performed. Hydrodilssection with BSS was carried out. Utilizing the phaco-emulsifier, the phacoemulsification of the cataract was accomplished. Using the irrigation-aspiration unit the remaining cortical material was removed. The intraocular lens was inspected and inserted into the bag with no difficulty after healon was instilled in the anterior chamber. The healon was removed with irrigation-aspiration unit and the wound was inspected to be self-sealing. The conjunctiva flap was then reapproximated with cautery. Sub-Tenon injection of 0.5 ml of Celestone and 0.5 ml Ancef was given. NeoDecadron was placed in the cul-de-sac. The patients eye was patched and shielded. The patient was returned to recovery in satisfactory condition with no complications from surgery or anesthesia.     The patient was discharged on the same day with home-going

## 2022-11-18 NOTE — PROGRESS NOTES
Patient returned to room from [address] [place] [address]. Report received from [facility]. Bed brakes applied and VS obtained. See flow sheet. Patient A/O x4. Drink and snack offered. Dressing to right eye dry/intact with no drainage. Will continue to monitor. Call light in reach.

## 2022-11-18 NOTE — ANESTHESIA POSTPROCEDURE EVALUATION
Department of Anesthesiology  Postprocedure Note    Patient: Ashley Watson  MRN: [id number]  YOB: 1965  Date of evaluation: 11/18/2022      Procedure Summary     Date: 11/18/22 Room / Location: [address] [place] [address] 01 / [facility]    Anesthesia Start: 0900 Anesthesia Stop: 6578    Procedure: RIGHT EYE CATARACT EXTRACTION WITH INTAOCULAR LENS IMPLANT (Right: Eye) Diagnosis:       Other age-related cataract of right eye      (Other age-related cataract of right eye [H25.89])    Surgeons: Katie Spears MD Responsible Provider: FABIAN Melgoza CRNA    Anesthesia Type: MAC ASA Status: 2          Anesthesia Type: MAC    Brennen Phase I:      Brennen Phase II:        Anesthesia Post Evaluation    Patient location during evaluation: bedside  Patient participation: complete - patient participated  Level of consciousness: sleepy but conscious  Pain score: 0  Airway patency: patent  Nausea & Vomiting: no nausea and no vomiting  Complications: no  Cardiovascular status: blood pressure returned to baseline and hemodynamically stable  Respiratory status: acceptable  Hydration status: stable

## 2022-11-18 NOTE — DISCHARGE INSTRUCTIONS
Leave your eye patch/shield on until your follow up appointment with Dr. Marcelina Councilman later today. Avoid any activity that will cause you to strain. No lifting, pushing or pulling greater than 10 lbs until ok'ed by Dr. Marcelina Councilman. Call his office with any questions. [facility] in [address] (Same Day Surgery)    Do not drive, work around [address] or use equipment. Do not drink any alcoholic beverages. Do not smoke while alone. Avoid making important decisions. Plan to spend a quiet, relaxed evening @ home. Resume normal activities as you begin to feel better. Eat lightly for your first meal, then gradually increase your diet to what is normal for you. In case of nausea, avoid food and drink only clear liquids. Resume food as nausea ceases. Notify your surgeon if you experience fever, chills, large amount of bleeding, difficulty breathing, persistent nausea and vomiting or any other disturbing problem. Call for a follow-up appointment with your surgeon. Advance Care Planning  People with COVID-19 may have no symptoms, mild symptoms, such as fever, cough, and shortness of breath or they may have more severe illness, developing severe and fatal pneumonia. As a result, Advance Care Planning with attention to naming a health care decision maker (someone you trust to make healthcare decisions for you if you could not speak for yourself) and sharing other health care preferences is important BEFORE a possible health crisis. Please contact your Primary Care Provider to discuss Advance Care Planning.     Preventing the Spread of Coronavirus Disease 2019 in Homes and Residential Communities  For the most recent information go to [employer]    Prevention steps for People with confirmed or suspected COVID-19 (including persons under investigation) who do not need to be hospitalized  and   People with confirmed COVID-19 who were hospitalized and determined to be medically stable to go home    Your healthcare provider and public health staff will evaluate whether you can be cared for at home. If it is determined that you do not need to be hospitalized and can be isolated at home, you will be monitored by staff from your local or state health department. You should follow the prevention steps below until a healthcare provider or local or state health department says you can return to your normal activities. Stay home except to get medical care  People who are mildly ill with COVID-19 are able to isolate at home during their illness. You should restrict activities outside your home, except for getting medical care. Do not go to work, school, or public areas. Avoid using public transportation, ride-sharing, or taxis. Separate yourself from other people and animals in your home  People: As much as possible, you should stay in a specific room and away from other people in your home. Also, you should use a separate bathroom, if available. Animals: You should restrict contact with pets and other animals while you are sick with COVID-19, just like you would around other people. Although there have not been reports of pets or other animals becoming sick with COVID-19, it is still recommended that people sick with COVID-19 limit contact with animals until more information is known about the virus. When possible, have another member of your household care for your animals while you are sick. If you are sick with COVID-19, avoid contact with your pet, including petting, snuggling, being kissed or licked, and sharing food. If you must care for your pet or be around animals while you are sick, wash your hands before and after you interact with pets and wear a facemask. Call ahead before visiting your doctor  If you have a medical appointment, call the healthcare provider and tell them that you have or may have COVID-19.  This will help the healthcare instructions for safe and effective use of the cleaning product including precautions you should take when applying the product, such as wearing gloves and making sure you have good ventilation during use of the product. Monitor your symptoms  Seek prompt medical attention if your illness is worsening (e.g., difficulty breathing). Before seeking care, call your healthcare provider and tell them that you have, or are being evaluated for, COVID-19. Put on a facemask before you enter the facility. These steps will help the healthcare providers office to keep other people in the office or waiting room from getting infected or exposed. Ask your healthcare provider to call the local or state health department. Persons who are placed under active monitoring or facilitated self-monitoring should follow instructions provided by their local health department or occupational health professionals, as appropriate. When working with your local health department check their available hours. If you have a medical emergency and need to call 911, notify the dispatch personnel that you have, or are being evaluated for COVID-19. If possible, put on a facemask before emergency medical services arrive. Discontinuing home isolation  Patients with confirmed COVID-19 should remain under home isolation precautions until the risk of secondary transmission to others is thought to be low. The decision to discontinue home isolation precautions should be made on a case-by-case basis, in consultation with healthcare providers and state and local health departments. Cataract Surgery: What to Expect at [address] [place] [address] Drive had cataract surgery. It replaced your cloudy natural lens with a clear artificial one. After surgery, your eye may feel scratchy, sticky, or uncomfortable. It may also water more than usual.  Most people see better 1 to 3 days after surgery.  But it could take 3 to 10 weeks to get the full benefits of surgery and to see as clearly as possible. Your doctor may send you home with a bandage, patch, or clear shield on your eye. This will keep you from rubbing your eye. Your doctor will also give you eyedrops to help your eye heal. Use them exactly as directed. You can read or watch TV right away, but things may look blurry. Most people are able to return to work or their normal routine in 1 to 3 days. After your eye heals, you may still need to wear glasses, especially for reading. This care sheet gives you a general idea about how long it will take for you to recover. But each person recovers at a different pace. Follow the steps below to get better as quickly as possible. How can you care for yourself at home? Activity    Rest when you feel tired. Getting enough sleep will help you recover. You may have trouble judging distances for a few days. Move slowly, and be careful going up and down stairs and pouring hot liquids. Ask for help if you need it. Ask your doctor when it is okay to drive. Wear your eye bandage, patch, or shield for as long as your doctor recommends. You may only need to wear it when you sleep. You can shower or wash your hair the day after surgery. Keep water, soap, shampoo, hair spray, and shaving lotion out of your eye, especially for the first week. Do not rub or put pressure on your eye for at least 1 week. Do not wear eye makeup for 1 to 2 weeks. You may also want to avoid face cream or lotion. Do not get your hair colored or permed for 10 days after surgery. Do not bend over or do any strenuous activities, such as biking, jogging, weight lifting, or aerobic exercise, for 2 weeks or until your doctor says it is okay. Avoid swimming, hot tubs, gardening, and dusting for 1 to 2 weeks. Wear sunglasses on bright days for at least 1 year after surgery. Medicines    Your doctor will tell you if and when you can restart your medicines.  You will also be given instructions about taking any new medicines. If you stopped taking aspirin or some other blood thinner, your doctor will tell you when to start taking it again. Follow your doctor's instructions for when to use your eyedrops. Always wash your hands before you put your drops in. To put in eyedrops:  Tilt your head back, and pull your lower eyelid down with one finger. Drop or squirt the medicine inside the lower lid. Close your eye for 30 to 60 seconds to let the drops or ointment move around. Do not touch the ointment or dropper tip to your eyelashes or any other surface. Follow your doctor's instructions for taking pain medicines. Follow-up care is a key part of your treatment and safety. Be sure to make and go to all appointments, and call your doctor if you are having problems. It's also a good idea to know your test results and keep a list of the medicines you take. When should you call for help? Call 911 anytime you think you may need emergency care. For example, call if:    You passed out (lost consciousness). You have a sudden loss of vision. You have sudden chest pain, are short of breath, or cough up blood. Call your doctor now or seek immediate medical care if:    You have signs of an eye infection, such as:  Pus or thick discharge coming from the eye. Redness or swelling around the eye. A fever. You have new or worse eye pain. You have vision changes. You have symptoms of a blood clot in your leg (called a deep vein thrombosis), such as:  Pain in the calf, back of the knee, thigh, or groin. Redness and swelling in your leg or groin. Watch closely for changes in your health, and be sure to contact your doctor if:    You do not get better as expected. Where can you learn more? Go to https://migue.[employer]. org and sign in to your [employer] account.  Enter R255 in the Arieso box to learn more about \"Cataract Surgery: What to Expect at Home. \"     If you do not have an account, please click on the \"Sign Up Now\" link. Current as of: January 24, 2022               Content Version: 13.4  © 2006-2022 Healthwise, Incorporated. Care instructions adapted under license by [facility] ([facility]). If you have questions about a medical condition or this instruction, always ask your healthcare professional. [address] any warranty or liability for your use of this information.

## 2022-11-18 NOTE — PROGRESS NOTES
Patient remains A/O x4. VS stable. Dressing to right eye  remains D/I. Patient drank OJ and ate crackers without c/o nausea or vomiting. Patient ready for discharge home. Verbal and written discharge instructions reviewed with patient. She signed and received copies after voicing understanding to all. Patient instructed to call MD's office with any questions that arise. Patient voices understanding. Patient's sister picking her up.

## 2022-11-22 ENCOUNTER — HOSPITAL ENCOUNTER (OUTPATIENT)
Dept: PSYCHIATRY | Age: 57
Setting detail: THERAPIES SERIES
Discharge: HOME | End: 2022-11-22
Payer: MEDICARE

## 2022-11-22 PROCEDURE — 90832 PSYTX W PT 30 MINUTES: CPT

## 2022-11-22 NOTE — PROGRESS NOTES
[facility]                Progress Note    [] Juliocesar  [x] Socorro ring                    Patient Name: Ada Fernandez   : 1965     Case # :  [id number]  Therapist: Noe Garcia [facility]        Objective/Service/Time:    IT 30 Minutes The Client stated their name and soc #. Client agreed to Telehealth and reported in a Confidential setting. Client and counselor will call back if there is a disconnect. Client has the crisis # for  needs. S- Client reports serving others for thanksgiving and  helping a friend we remodel friend's trailer who has cancer. Client plans to work "[employer] to relax later this week. Client attends [employer] every other week, and sees her PO at [employer] as needed. Client likes to rehab furniture, and painting walls.   O- Client denies abuse, empathic, service oriented  A- Client processed current goals and the importance of her using her service & skills  P- Client enjoys the "[employer] volunteering and helping others, attends [employer] and PO as needed                    Noe Garcia MA, [facility], GAUDENCIO, MAC 22, 1:47 PM

## 2022-11-29 ENCOUNTER — HOSPITAL ENCOUNTER (OUTPATIENT)
Dept: PSYCHIATRY | Age: 57
Setting detail: THERAPIES SERIES
Discharge: HOME | End: 2022-11-29
Payer: MEDICARE

## 2022-11-29 PROCEDURE — 90834 PSYTX W PT 45 MINUTES: CPT

## 2022-11-29 NOTE — PROGRESS NOTES
[facility]                Progress Note    [] Juliocesar  [x] Socorro ring                    Patient Name: Markell Zarco   : 1965     Case # :  ***  Therapist: Puma Means        Objective/Service/Time:    Sue Lewis MA, LPC, LSW, MAC 22, 2:21 PM

## 2022-12-01 ENCOUNTER — HOSPITAL ENCOUNTER (OUTPATIENT)
Dept: PSYCHIATRY | Age: 57
Setting detail: THERAPIES SERIES
Discharge: HOME | End: 2022-12-01
Payer: MEDICARE

## 2022-12-01 PROCEDURE — 90853 GROUP PSYCHOTHERAPY: CPT

## 2022-12-01 NOTE — GROUP NOTE
612 [facility] Group Therapy Note      12/1/2022    Location:  [facility]    Clients Presents: [phone]    Clients Absent: [phone]    Length of session: 1.5 hours    Group Note: OP    Group Type: Co-Ed    New members were welcomed and introduced. Norms and expectations of group were discussed. Content: Client's discussed Distress Tolerance CBT and coping tools in their sober Journey. 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     Tenn[facility]  12/1/2022 12:22 PM    Co-Therapist: N/A

## 2022-12-06 ENCOUNTER — HOSPITAL ENCOUNTER (OUTPATIENT)
Dept: PSYCHIATRY | Age: 57
Setting detail: THERAPIES SERIES
Discharge: HOME | End: 2022-12-06
Payer: MEDICARE

## 2022-12-06 PROCEDURE — 90832 PSYTX W PT 30 MINUTES: CPT

## 2022-12-06 ASSESSMENT — ANXIETY QUESTIONNAIRES
4. TROUBLE RELAXING: 0
IF YOU CHECKED OFF ANY PROBLEMS ON THIS QUESTIONNAIRE, HOW DIFFICULT HAVE THESE PROBLEMS MADE IT FOR YOU TO DO YOUR WORK, TAKE CARE OF THINGS AT HOME, OR GET ALONG WITH OTHER PEOPLE: NOT DIFFICULT AT ALL
5. BEING SO RESTLESS THAT IT IS HARD TO SIT STILL: 0
7. FEELING AFRAID AS IF SOMETHING AWFUL MIGHT HAPPEN: 0
6. BECOMING EASILY ANNOYED OR IRRITABLE: 0
2. NOT BEING ABLE TO STOP OR CONTROL WORRYING: 0
1. FEELING NERVOUS, ANXIOUS, OR ON EDGE: 0
3. WORRYING TOO MUCH ABOUT DIFFERENT THINGS: 0
GAD7 TOTAL SCORE: 0

## 2022-12-06 NOTE — PROGRESS NOTES
[facility]                Progress Note    [] Juliocesar  [x] Socorro ring                    Patient Name: Gloria Jang   : 1965     Case # :  [id number]  [facility]        Objective/Service/Time:    IT 30 Minutes Telehealth, The Client stated their name and soc #. Client agreed to Telehealth and reported in a Confidential setting. Client and counselor will call back if there is a disconnect. Client has the crisis # for  needs. Topic:  discuss after care; 2 Mental Health screening, Anxiety screening, Trauma assessment per J-Rig  S- Client Reports, has the flu impacting voice, throat and headaches. Client is concerned because her friends are ill. Client encouraged to follow up with a provider as needed. Client reading reporting to J-Rig. O- Client shared freely, physically illness caught from friends, client cooperative, shared in the screening  A- Client  responded to the ANNA, CRSI S/I, Mood Screen, and Trauma Screening at this time Client denied any problems, Client stated she had MH issues when abusing and withdrawing from Meth. P- Client will follow up with J-Rig, her PCP, and return to [facility] when symptoms clear. Client requests after care in  and 2023.                   Izabela Bajwa MA, LPC, LSW, MAC 22, 3:02 PM

## 2022-12-06 NOTE — PROGRESS NOTES
[facility]                Progress Note    [] Juliocesar  [x] Socorro ring                    Patient Name: Taran Atkins   : 1965     Case # :  [id number]  Therapist: Shanae Lozoya [facility]        Objective/Service/Time:    IT 30 Minutes Topic:  discuss after care; 2 Mental Health screening, Anxiety screening, Trauma assessment per J-Rig  S- Client Reports, has the flu impacting voice, throat and headaches. Client is concerned because her friends are ill. Client encouraged to follow up with a provider as needed. Client reading reporting to J-Rig. O- Client shared freely, physically illness caught from friends, client cooperative, shared in the screening  A- Client  responded to the ANNA, CRSI S/I, Mood Screen, and Trauma Screening at this time Client denied any problems, Client stated she had MH issues when abusing and withdrawing from Meth. P- Client will [facility] her [facility], and return to [facility] when symptoms clear. Client requests after care in  and 2023.                   Shanae Lozoya MA, JORI, LSW, MAC 22, 2:05 PM

## 2022-12-08 ENCOUNTER — HOSPITAL ENCOUNTER (OUTPATIENT)
Dept: PSYCHIATRY | Age: 57
Setting detail: THERAPIES SERIES
Discharge: HOME | End: 2022-12-08
Payer: MEDICARE

## 2022-12-08 NOTE — GROUP NOTE
612 [facility] Group Therapy Note      12/8/2022    Location:  [facility]    Clients Presents: [phone]    Clients Absent: [phone]     Length of session: 1.5 hours    Group Note: OP    Group Type: Co-Ed    New members were welcomed and introduced. Norms and expectations of group were discussed. Content: Clients discussed ways to improve your mood, the impact of coping and managing adrenaline in relationship to Client's sober journey. Client's discussed anger and powerlessness per Father Jam's discussion.     Frandy Benz [address]  12/8/2022 11:59 AM    Co-Therapist: N/A      [facility] Individual [facility] Progress Note    Hugh Pena  1965  12/8/2022    Notes on Client Progress in Group    Reason for Absence: CXL during IT has the flu    Molly Scott Se  12/8/2022 12:07 PM    Co-Therapist: N/A

## 2022-12-13 ENCOUNTER — HOSPITAL ENCOUNTER (OUTPATIENT)
Dept: PSYCHIATRY | Age: 57
Setting detail: THERAPIES SERIES
Discharge: HOME | End: 2022-12-13
Payer: MEDICARE

## 2022-12-13 PROCEDURE — 90834 PSYTX W PT 45 MINUTES: CPT

## 2022-12-13 NOTE — PROGRESS NOTES
612 [facility] TREATMENT PLAN      Location: [] [place] [x] Socorro ring    Treatment plan: Update    Strengths: caring, service oriented    Weakness/Limitations: past abuse of AOD, legal    Service/Frequency/Duration: Individual 1wk, Group assigned completed, and Urinalysis Random     Diagnosis: F10.20 Other and unspecified alcohol dependence/unspecified drinking behavior and F15.20 Amphetamine and other psychostimulant dependence-unspecified use    Level of Care: 1 Outpatient Services    Problem: Client abuse of Meth and formerly alcohol, past treatment at [facility]   Goal: Client continue sober living in 90 days   Objectives:   1) Client 2 benefits of volunteering with friends and agencies ie- CRSI in 90 days  Evaluation Date: 03/13/23 Code: C Continue TBD  2) Maintaining 2 strategies and discuss for sober in living in 90 days  Evaluation Date: 03/13/23 Code: C Continue TBD      2. Problem: Coping with boredom and stay active  Goal: Client to maintain a plan for wellness in 90 days   Objectives:   1) Client read 1-2 pro social books and discuss in 90 days  Evaluation Date: 03/13/23 Code: C Continue TBD   2) Client discuss the benefits of attending the Y 2 x a month in 90 days  Evaluation Date: 03/13/23 Code: C Continue TBD       3. Problem: Client on [employer] in [facility], post [facility] Release   Goal: Client continue to meet the expectations of J-Rig and her PO in 90 days    Objectives:   1) Discuss the 2 benefits of attending J-Rig in 90 days  Evaluation Date: 03/13/23 Code: C Continue TBD      Defer: Smoke Cessation    Discharge Plan/Instructions: Client maintain an active life, sober from drugs, volunteering and enjoying family. Laura Lester / 1965 has participated in the treatment plan development outlined above on 12/13/2022.      Javier Merida [facility]  12/13/2022/1:54 PM

## 2022-12-13 NOTE — PROGRESS NOTES
[facility] TREATMENT PLAN      Location: [] [place] [x] Nya Angelo    Treatment plan: Initial    Strengths: [place] Mother and grandmother, gardening    Weakness/Limitations: Alcohol, Drug Abuse, and Legal issues    Service/Frequency/Duration: Individual 1wk and Group assigned 1wk Random UDS in 90 days    Diagnosis: F10.20 Other and unspecified alcohol dependence/unspecified drinking behavior and F15.20 Amphetamine and other psychostimulant dependence-unspecified use    Level of Care: 1 Outpatient Services    Problem:  Client history of Alcohol Abuse, 5 ENMANUEL's (sober 7 years), Abused Methamphetamines, Treatment at [facility] in 2021    Goal: Client maintain sober living in the [place] [id number] in 90 days  Objectives:   1) Discussed 2 triggers and 2 cravings to abuse drugs and alcohol in 90 days  Evaluation Date: 12/13/22 Code: C Continue TBD            ACHIEVED  10-25-22   Using people who have drugs, past thought could use just once, couldn't report any other triggers, reports             Using because every once else was  2) Client discussed 2 benefits of attending the Recovery Zone in 90 days  Evaluation Date: 12/13/22 Code: C Continue TBD           * Reports not interested in Recovery Zone, past years attended AA, close friend will go to [employer] on occasion and she will attend      2.     Problem: Client reports challenges with Generalized anxiety, peer pressures to use, and finding food and clothing   Goal: Client develop resources to cope with emotions, social pressure and finding food/clothing in 90 days   Objectives:   1) Client report 2 benefits of [facility] and the [facility] program in 90 days  Evaluation Date: 12/13/22 Code: C Continue TBD   2) Client report 2 benefits of , Medical support from Dr. Lisa Silver in 90 days  Evaluation Date: 12/13/22 Code: C Continue TBD not achieved 12/13/22              3) Client report 2 coping tools to manage emotions in 90 days  Evaluation Date: 12/13/22 Code: C Continue TBD In Group coping tools achieved    3. Problem: Client previous 3 drug possession charges in [facility] prior to placement at [facility] in 2021   Goal: Meet the expectations of JRig Program and [facility]. In 90 days    Objectives:   1) Client reports 2 benefits of the J-Rig Program and [facility] In 90 days  Evaluation Date: 12/13/22 Code: C Continue TBD 2x monthly Dec 15th/22 Achieved      Defer: Explore part time work    Discharge Plan/Instructions: Client's goal maintain family relationships, drug free living, increase activities with crafts, gardening and the recovery zone. Kulwinder Kovacs / 1965 has participated in the treatment plan development outlined above on 12/13/2022.      Dandy Lozano [facility]  12/13/2022/1:51 PM

## 2022-12-15 ENCOUNTER — HOSPITAL ENCOUNTER (OUTPATIENT)
Dept: PSYCHIATRY | Age: 57
Setting detail: THERAPIES SERIES
Discharge: HOME | End: 2022-12-15
Payer: MEDICARE

## 2022-12-15 PROCEDURE — 90853 GROUP PSYCHOTHERAPY: CPT

## 2022-12-15 NOTE — GROUP NOTE
612 [facility] Group Therapy Note      12/15/2022    Location:  [employer]    Clients Presents: [phone]    Clients Absent: [phone]    Length of session: 1.5 hours    Group Note: OP    Group Type: Co-Ed    New members were welcomed and introduced. Norms and expectations of group were discussed. Content: Client's disclosed and assessed their soberity per The hole in the Sidewalk, by Chantelle; Client discussed focusing on the 12 steps and 12 virtues of AA,  Client's discussed the S. Nathaniel Sober story and strategies. Puma Blanc  12/15/2022 2:13 PM    Co-Therapist: N/A      [facility] Individual Group Progress Note    Lisbeth Paris  1965  12/15/2022    Notes on Client Progress in Group  Client reported in her last group how she has maintain sober living, and stable mental health. Client related to the virtue Perseverance and taking regular inventory. Client stated has positive sober friends and is beginning to serve others.     Puma Blanc  12/15/2022 2:19 PM    Co-Therapist: N/A

## 2022-12-20 ENCOUNTER — HOSPITAL ENCOUNTER (OUTPATIENT)
Dept: PSYCHIATRY | Age: 57
Setting detail: THERAPIES SERIES
Discharge: HOME | End: 2022-12-20
Payer: MEDICARE

## 2022-12-20 PROCEDURE — 90832 PSYTX W PT 30 MINUTES: CPT

## 2022-12-22 ENCOUNTER — APPOINTMENT (OUTPATIENT)
Dept: PSYCHIATRY | Age: 57
End: 2022-12-22
Payer: MEDICARE

## 2022-12-27 ENCOUNTER — HOSPITAL ENCOUNTER (OUTPATIENT)
Dept: PSYCHIATRY | Age: 57
Setting detail: THERAPIES SERIES
Discharge: HOME | End: 2022-12-27
Payer: MEDICARE

## 2022-12-27 PROCEDURE — 80305 DRUG TEST PRSMV DIR OPT OBS: CPT

## 2022-12-27 PROCEDURE — 90834 PSYTX W PT 45 MINUTES: CPT

## 2022-12-27 NOTE — PROGRESS NOTES
[facility]                Progress Note    [] Juliocesar  [x] Blease Clipper                    Patient Name: Amairani Gee   : 1965     Case # :  [id number]  Therapist: Nery Jones [facility]        Objective/Service/Time:    IT 1 UDS . 35 Topic 1 maintaining sober living with [employer], H.E.R.O  S- Client reports has been snowed in and stayed home by herself & face timed her sons and grandchildren. Client denies any problems, shared the history years ago  and  her spouse who was alcoholic and drug addict. O- Client applies the HERO model and was taught this [place] farm.   A- Client and his writer discussed  Hope Efficacy Resilience Optimdanoissm    P- Client after care at LORAINE Echeverria, [facility], LSW, MAC 22, 2:52 PM

## 2022-12-29 ENCOUNTER — APPOINTMENT (OUTPATIENT)
Dept: PSYCHIATRY | Age: 57
End: 2022-12-29
Payer: MEDICARE

## 2023-01-03 ENCOUNTER — HOSPITAL ENCOUNTER (OUTPATIENT)
Dept: PSYCHIATRY | Age: 58
Setting detail: THERAPIES SERIES
Discharge: HOME | End: 2023-01-03
Payer: MEDICARE

## 2023-01-03 PROCEDURE — 90832 PSYTX W PT 30 MINUTES: CPT

## 2023-01-03 NOTE — PROGRESS NOTES
[facility]                Progress Note    [] Juliocesar  [x] Jose Herrera                    Patient Name: Jaret Good   : 1965     Case # :  [id number]  [facility]        Objective/Service/Time:    IT      Minutes,   Telehealth , The Client stated their name and soc #. Client agreed to [employer] and reported in a Confidential setting. Client and counselor will call back if there is a disconnect. Client has the crisis # for  needs.  Maintaining  S- Client reports, went J-Rig and PO today. Client staying home connecting on the phone with sober friends. 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 Client plans to walk and use the [employer] when she has a way. Client watching inspirational and true movies. Client contemplating reading addiction books. O- Client is focused, full range response, setting sober supports and activities. A- Client discussed how she is challenging herself and taking care of Idle time. P- Client in aftercare attends [facility] 1 x weekly.                   Jeff Doan MA, JORI, LSW, MAC 23, 1:58 PM

## 2023-01-10 ENCOUNTER — HOSPITAL ENCOUNTER (OUTPATIENT)
Dept: PSYCHIATRY | Age: 58
Setting detail: THERAPIES SERIES
Discharge: HOME | End: 2023-01-10
Payer: MEDICARE

## 2023-01-10 NOTE — PROGRESS NOTES
[facility]                Progress Note    [] Juliocesar  [x] Socorro maría elena                    Patient Name: Camilo Hernandes   : 1965     Case # :  [id number]  Therapist: Becky Brennan [facility]        Objective/Service/Time:    Client called and CXL reported has a delayed holiday time with sons out the city.                    Becky Brennan MA, LPC, LSW, MAC 01/10/23, 2:12 PM

## 2023-01-17 ENCOUNTER — HOSPITAL ENCOUNTER (OUTPATIENT)
Dept: PSYCHIATRY | Age: 58
Setting detail: THERAPIES SERIES
Discharge: HOME | End: 2023-01-17
Payer: MEDICARE

## 2023-01-17 PROCEDURE — 90832 PSYTX W PT 30 MINUTES: CPT

## 2023-01-17 NOTE — PROGRESS NOTES
[facility]                Progress Note    [] Juliocesar  [x] Socorro ring                    Patient Name: Nathaniel Tran   : 1965     Case # :  [id number]  [facility]        Objective/Service/Time:    Tele-health, 30 minutes IT,  transportation could not going to bring, The Client stated their name and soc #. Client agreed to [employer] and reported in a Confidential setting. Client and counselor will call back if there is a disconnect. Client has the crisis # for  needs. Topic: coping and after care with family support, remodeling for a friend,  client bought paints and going to paint pictures   S- Client reports doing well, has support of friends, family and J-Rig,  Celebrated Michelle and her 58th birthday with her family last week. Client helping a friend, painting pictures. O- Client motivated, in after care per the plan, no issues with cravings or desires  A- Client agreed to Wed appts due to transportation conflict. Client using contacts, supports and creative to stay focused on a sober path.   P- Creative and remodelling, and accountability with Caitlin Kaplan MA, LPC, LSW, MAC 23, 2:08 PM

## 2023-01-24 ENCOUNTER — APPOINTMENT (OUTPATIENT)
Dept: PSYCHIATRY | Age: 58
End: 2023-01-24
Payer: MEDICARE

## 2023-01-25 ENCOUNTER — HOSPITAL ENCOUNTER (OUTPATIENT)
Dept: PSYCHIATRY | Age: 58
Setting detail: THERAPIES SERIES
Discharge: HOME | End: 2023-01-25
Payer: MEDICARE

## 2023-01-25 PROCEDURE — 90832 PSYTX W PT 30 MINUTES: CPT

## 2023-01-25 NOTE — PROGRESS NOTES
[facility]                Progress Note    [] Juliocesar  [x] Poonam Bahena                    Patient Name: Mac Mcnamara   : 1965     Case # :  [id number]  Therapist: Chloe Kelly [facility]        Objective/Service/Time:    IT         30 minutes,  Topic:  maintaining sober life  The Client stated their name and soc #. Client agreed to Telehealth and reported in a Confidential setting. Client and counselor will call back if there is a disconnect. Client has the crisis # for  needs.   S- Client reports,                    Chloe Kelly MA, LPC, LSW, MAC 23, 3:01 PM

## 2023-01-31 ENCOUNTER — APPOINTMENT (OUTPATIENT)
Dept: PSYCHIATRY | Age: 58
End: 2023-01-31
Payer: MEDICARE

## 2023-02-01 ENCOUNTER — HOSPITAL ENCOUNTER (OUTPATIENT)
Dept: PSYCHIATRY | Age: 58
Setting detail: THERAPIES SERIES
Discharge: HOME | End: 2023-02-01
Payer: MEDICARE

## 2023-02-01 PROCEDURE — 90834 PSYTX W PT 45 MINUTES: CPT

## 2023-02-01 PROCEDURE — 80305 DRUG TEST PRSMV DIR OPT OBS: CPT

## 2023-02-01 NOTE — PROGRESS NOTES
[facility]                Progress Note    [] Juliocesar  [x] Anabell La                    Patient Name: Lei Hermosillo   : 1965     Case # :  [id number]  Therapist: Yareli Islas [facility]        Objective/Service/Time:    IT 1.0 UDS . 35 Topic: Discharge Planning, and preparation, J-Rig  S- client reports working with ROMELIA-Mandy, getting waivers so she can pursue DL after 26 years. Client has friends and sons support. Client continues to do art work and help her friends.   O- Client is bright, motivated and focused  A- Client and this writer discussed her positive news and preparing for discharge at the end of the month  P- Client continue J- Rig and Preethi Bonilla [place], [place], Valeri 23, 3:34 PM

## 2023-02-08 ENCOUNTER — HOSPITAL ENCOUNTER (OUTPATIENT)
Dept: PSYCHIATRY | Age: 58
Setting detail: THERAPIES SERIES
Discharge: HOME | End: 2023-02-08
Payer: MEDICARE

## 2023-02-08 NOTE — PROGRESS NOTES
[facility]                Progress Note    [] Juliocesar  [x] Sho Dominguez                    Patient Name: Melissa Rae   : 1965     Case # :  [id number]  Therapist: Giancarlo Cartwright [facility]        Objective/Service/Time:    IT Counselling, cancelled, due to  reported, Just spoke with Manuel Sims, who said she will not make it to appointment today. ( she is working on [employer] and didn't realize how late it was. )                 Giancarlo Cartwright MA, LPC, LSW, [facility] 23, 2:49 PM

## 2023-02-15 ENCOUNTER — HOSPITAL ENCOUNTER (OUTPATIENT)
Dept: PSYCHIATRY | Age: 58
Setting detail: THERAPIES SERIES
Discharge: HOME | End: 2023-02-15
Payer: MEDICARE

## 2023-02-15 PROCEDURE — 90834 PSYTX W PT 45 MINUTES: CPT

## 2023-02-15 NOTE — PROGRESS NOTES
[facility]                Progress Note    [] [place]  [x] Socorro maría elena                    Patient Name: Bethany East   : 1965     Case # :  [id number]  Therapist: Sanjay Arndt [facility]        Objective/Service/Time:    IT 1 Topic:  maintaining sober living, coping tools  S- Client reports,  continuing with J-Rig and PO. Client has reconnected with sober peers and has been volunteering and been encouraged with the friendship. O- Client reports back pain, injured years ago,  current pain due to cleaning friends house. Client excited for Spring, and in process to get a car & her license back. A- Client stated key for her is to connect with only sober peers and friends. Client reports friends hold her accountable and also they have healthy fun. Client enjoys using her gifts of crafts, art, cooking and doing things with her hands. Client excited about having a car so she can connect more often with her sons and their families. Client motivated by them. P- Client going to study for permit, Client going to attend J-Rig on Thursday,  Client has daily connection with her sober peer group.                   Sanjay Arndt MA, LPC, LSW, MAC 02/15/23, 2:48 PM

## 2023-02-22 ENCOUNTER — HOSPITAL ENCOUNTER (OUTPATIENT)
Dept: PSYCHIATRY | Age: 58
Setting detail: THERAPIES SERIES
Discharge: HOME | End: 2023-02-22
Payer: MEDICARE

## 2023-02-22 PROCEDURE — 90834 PSYTX W PT 45 MINUTES: CPT

## 2023-02-22 NOTE — PROGRESS NOTES
[facility]                Progress Note    [] Juliocesar  [x] Socorro maría elena                    Patient Name: Tete Key   : 1965     Case # :  [id number]  Therapist: Van Kennedy [facility]        Objective/Service/Time:    IT 45 Minutes  Topic:  Discharge Treatment Plan  S- Client reports,  active with J-Rig, reading, crafts, fellowship with friends and plans to use the Y. Client motivated to maintain sober living, Client reads, watches movies and influenced by her adult sons. Client hopes to have her DL soon and a car. O- Client full range affect, motivated, engaging to continue in positive relationships.   A-Client completed the [facility], Discharge treatment Plan, and Reach Survey  P- Client agrees to follow Discharge Treatment Plan and rely on sober supports & activities                  [place]i [place], [place], [facility] 23, 3:09 PM

## 2023-02-22 NOTE — PROGRESS NOTES
612 [facility] Discharge Treatment Plan    Manisha Elaine  1965  Case # [id number]    Location: [] [place] [x] Linda Suresh    A. Stresses that I need to monitor  1. financial   2. Maintain positive friendship         B. Major triggers to using alcohol/drugs   1. Hanging with the wrong people, places, and things       Sober Plan   1. Stay active with friends   2. Client continue with crafting   3. Maintain with J Mandy and PO    C. Sobriety Support   1. Friend: Nicole Puente   2. Treatment staff: [address] [place] [address]   3. Family member: Kadi   4. AA/NA recovery program, sponsor, meetings day/times, daily ready: Watch movies and reads    D. Non-using activities  1. [employer]   2. Hiking in the woods   3. Cook and grilling    E. Consequences of Drug/Alcohol use  1. PV       G. Short term goals to achieve  1. Getting a car and getting her license   2. Going on a cruise and vacations, with boys    H. Follow up recommendations  1. Client avoid idle time and certain people. Manisha Elaine / 1965 has participated in the discharge treatment plan development outlined above on 2/22/2023.      Lisa Krishnamurthy, [phone]  2/22/2023/2:58 PM

## 2023-03-07 ENCOUNTER — APPOINTMENT (OUTPATIENT)
Dept: PSYCHIATRY | Age: 58
End: 2023-03-07
Payer: MEDICARE

## 2023-03-16 ENCOUNTER — HOSPITAL ENCOUNTER (EMERGENCY)
Age: 58
Discharge: HOME | End: 2023-03-16
Attending: EMERGENCY MEDICINE
Payer: MEDICARE

## 2023-03-16 VITALS
BODY MASS INDEX: 28.79 KG/M2 | RESPIRATION RATE: 18 BRPM | HEIGHT: 68 IN | DIASTOLIC BLOOD PRESSURE: 73 MMHG | OXYGEN SATURATION: 96 % | SYSTOLIC BLOOD PRESSURE: 127 MMHG | WEIGHT: 190 LBS | HEART RATE: 80 BPM | TEMPERATURE: 97.8 F

## 2023-03-16 DIAGNOSIS — M54.50 ACUTE MIDLINE LOW BACK PAIN, UNSPECIFIED WHETHER SCIATICA PRESENT: Primary | ICD-10-CM

## 2023-03-16 PROCEDURE — 96372 THER/PROPH/DIAG INJ SC/IM: CPT

## 2023-03-16 PROCEDURE — 6360000002 HC RX W HCPCS: Performed by: EMERGENCY MEDICINE

## 2023-03-16 PROCEDURE — 99284 EMERGENCY DEPT VISIT MOD MDM: CPT

## 2023-03-16 RX ORDER — KETOROLAC TROMETHAMINE 30 MG/ML
60 INJECTION, SOLUTION INTRAMUSCULAR; INTRAVENOUS ONCE
Status: COMPLETED | OUTPATIENT
Start: 2023-03-16 | End: 2023-03-16

## 2023-03-16 RX ORDER — DEXAMETHASONE SODIUM PHOSPHATE 10 MG/ML
10 INJECTION, SOLUTION INTRAMUSCULAR; INTRAVENOUS ONCE
Status: COMPLETED | OUTPATIENT
Start: 2023-03-16 | End: 2023-03-16

## 2023-03-16 RX ORDER — LIDOCAINE 50 MG/G
1 PATCH TOPICAL EVERY 24 HOURS
Qty: 30 PATCH | Refills: 0 | Status: SHIPPED | OUTPATIENT
Start: 2023-03-16 | End: 2023-04-15

## 2023-03-16 RX ORDER — NAPROXEN 500 MG/1
500 TABLET ORAL 2 TIMES DAILY PRN
Qty: 20 TABLET | Refills: 0 | Status: SHIPPED | OUTPATIENT
Start: 2023-03-16 | End: 2023-03-26

## 2023-03-16 RX ORDER — METHYLPREDNISOLONE 4 MG/1
TABLET ORAL
Qty: 1 KIT | Refills: 0 | Status: SHIPPED | OUTPATIENT
Start: 2023-03-16 | End: 2023-03-22

## 2023-03-16 RX ADMIN — KETOROLAC TROMETHAMINE 60 MG: 30 INJECTION, SOLUTION INTRAMUSCULAR at 11:52

## 2023-03-16 RX ADMIN — DEXAMETHASONE SODIUM PHOSPHATE 10 MG: 10 INJECTION INTRAMUSCULAR; INTRAVENOUS at 11:54

## 2023-03-16 ASSESSMENT — PAIN DESCRIPTION - ORIENTATION
ORIENTATION: RIGHT;LOWER
ORIENTATION: MID;LOWER

## 2023-03-16 ASSESSMENT — PAIN - FUNCTIONAL ASSESSMENT
PAIN_FUNCTIONAL_ASSESSMENT: PREVENTS OR INTERFERES WITH MANY ACTIVE NOT PASSIVE ACTIVITIES
PAIN_FUNCTIONAL_ASSESSMENT: 0-10

## 2023-03-16 ASSESSMENT — PAIN DESCRIPTION - LOCATION
LOCATION: BACK
LOCATION: BACK

## 2023-03-16 ASSESSMENT — LIFESTYLE VARIABLES
HOW OFTEN DO YOU HAVE A DRINK CONTAINING ALCOHOL: MONTHLY OR LESS
HOW MANY STANDARD DRINKS CONTAINING ALCOHOL DO YOU HAVE ON A TYPICAL DAY: 1 OR 2

## 2023-03-16 ASSESSMENT — PAIN DESCRIPTION - PAIN TYPE: TYPE: ACUTE PAIN

## 2023-03-16 ASSESSMENT — PAIN SCALES - GENERAL
PAINLEVEL_OUTOF10: 10
PAINLEVEL_OUTOF10: 10

## 2023-03-16 NOTE — ED PROVIDER NOTES
Valerie 2266      Pt Name: Mary Alice Kam  MRN: [id number]  [place] 1965  Date of evaluation: 3/16/2023  Provider: Salo Carvajal MD    [address] [place] [address]       Chief Complaint   Patient presents with    Back Pain     C/o pains to middle lower back since last Wednesday after lifting a couch         HISTORY OF PRESENT ILLNESS    HPI    Nursing Notes were reviewed. This is a 59 y.o. female who presents to the emergency department with worsening low back pain for 1 week. Patient says that she was pushing a couch and lifting the couch by herself when she developed the sudden onset of low back pain. The patient has a remote history of spinal fracture many years ago without any significant back pain in the recent past.      Patient describes worsening low back pain radiating across her right buttocks and in the outside of her right leg. She describes episodic paresthesias. She describes increasing pain on Valsalva and flexion of her back. She denies bowel or bladder incontinence. She denies dysuria or frequency. She denies saddle anesthesia. She denies lower extremity weakness. She does describe increasing pain on ambulation but no weakness or dysmetria. PAST MEDICAL HISTORY     Past Medical History:   Diagnosis Date    Anxiety     Heart disease          SURGICAL HISTORY       Past Surgical History:   Procedure Laterality Date    ABLATION OF DYSRHYTHMIC FOCUS      CATARACT REMOVAL Right 11/18/2022    RIGHT EYE CATARACT EXTRACTION WITH INTAOCULAR LENS IMPLANT performed by Mando Bautista MD at [facility]       Previous Medications    FLUOXETINE (PROZAC) 20 MG/5ML SOLUTION    Take by mouth daily    HYDROXYZINE PAMOATE (VISTARIL) 25 MG CAPSULE    Take 25 mg by mouth 4 times daily       ALLERGIES     Patient has no known allergies.     FAMILY HISTORY       Family History   Problem Relation Age of Onset    No Known Problems Mother     No Known Problems Father     No Known Problems Sister     No Known Problems Brother     No Known Problems Maternal Aunt     No Known Problems Maternal Uncle     No Known Problems Paternal Aunt     No Known Problems Paternal Uncle     No Known Problems Maternal Grandmother     No Known Problems Maternal Grandfather     No Known Problems Paternal Grandmother     No Known Problems Paternal Grandfather     No Known Problems Maternal Cousin     No Known Problems Paternal Cousin           SOCIAL HISTORY       Social History     Socioeconomic History    Marital status: Single     Spouse name: None    Number of children: None    Years of education: None    Highest education level: None   Tobacco Use    Smoking status: Some Days     Types: Cigarettes    Smokeless tobacco: Never   Vaping Use    Vaping Use: Never used   Substance and Sexual Activity    Alcohol use: Not Currently    Drug use: Never       PHYSICAL EXAM       ED Triage Vitals [03/16/23 1118]   BP Temp Temp Source Heart Rate Resp SpO2 Height Weight   127/73 97.8 °F (36.6 °C) Oral 80 18 96 % 5' 8\" (1.727 m) 190 lb (86.2 kg)       Physical Exam  Vitals reviewed. Constitutional:       Appearance: Normal appearance. HENT:      Head: Normocephalic and atraumatic. Nose: Nose normal.      Mouth/Throat:      Mouth: Mucous membranes are moist.   Eyes:      Extraocular Movements: Extraocular movements intact. Cardiovascular:      Rate and Rhythm: Normal rate. Pulmonary:      Effort: Pulmonary effort is normal.   Abdominal:      General: Abdomen is flat. Tenderness: There is no abdominal tenderness. Musculoskeletal:         General: Normal range of motion. Skin:     General: Skin is warm and dry. Capillary Refill: Capillary refill takes less than 2 seconds. Neurological:      General: No focal deficit present. Mental Status: She is alert and oriented to person, place, and time. GCS: GCS eye subscore is 4.  GCS verbal subscore is 5. GCS motor subscore is 6. Cranial Nerves: No cranial nerve deficit. Sensory: No sensory deficit. Motor: No weakness. Coordination: Coordination normal.      Deep Tendon Reflexes: Reflexes normal.      Reflex Scores:       Patellar reflexes are 2+ on the right side and 2+ on the left side. Comments: Inner thigh Sensation Normal  Normal Thigh adduction - able to cross both legs  Able to Extend Knees  Normal Foot Dorsiflexion  Normal Foot Plantar Flexion  Normal Halux Dorsiflexion  Normal Patellar Reflexes  Normal Knee Flexion  Normal Groin Sensation           Vitals:    Vitals:    03/16/23 1118   BP: 127/73   Pulse: 80   Resp: 18   Temp: 97.8 °F (36.6 °C)   TempSrc: Oral   SpO2: 96%   Weight: 190 lb (86.2 kg)   Height: 5' 8\" (1.727 m)       DIAGNOSTIC RESULTS     RADIOLOGY:   Non-plain film images such as CT, Ultrasound and MRI are read by the radiologist. Plain radiographic images are visualized and preliminarily interpreted by the emergency physician with the below findings:    Interpretation per the Radiologist below, if available at the time of this note:    No orders to display       LABS:  Labs Reviewed - No data to display    All other labs were within normal range or not returned as of this dictation. MEDICAL DECISION MAKING     Medications   ketorolac (TORADOL) injection 60 mg (60 mg IntraMUSCular Given 3/16/23 1152)   dexamethasone (PF) (DECADRON) injection 10 mg (10 mg IntraMUSCular Given 3/16/23 1154)             Linnea Coma Scale  Eye Opening: Spontaneous  Best Verbal Response: Oriented  Best Motor Response: Obeys commands  [place] Coma Scale Score: 15                     CIWA Assessment  BP: 127/73  Heart Rate: 80                 MDM  This is a 59 y.o. female who presents to the emergency department with worsening low back pain for 1 week.   Patient says that she was pushing a couch and lifting the couch by herself when she developed the sudden onset of low back pain.  The patient has a remote history of spinal fracture many years ago without any significant back pain in the recent past.      Patient describes worsening low back pain radiating across her right buttocks and in the outside of her right leg. She describes episodic paresthesias. She describes increasing pain on Valsalva and flexion of her back. She denies bowel or bladder incontinence. She denies dysuria or frequency. She denies saddle anesthesia. She denies lower extremity weakness. She does describe increasing pain on ambulation but no weakness or dysmetria. Patient has a remote history of substance abuse, however she is not known to use alcohol or illicit substances in the recent past.  She has chronic ongoing pain, however her red flag score is less than 3 and she has a normal high-sensitivity neuro exam.    Imaging / Testing Considered:  I utilized an evidence-based risk rating tool (CMT) along with my training and experience to weigh the risk of discharge against the risks of further testing, imaging, or hospitalization. At this time I estimate the risks of additional testing, imaging, or hospitalization to be equal to or greater than the risk of discharge. I discussed my risk assessment with the patient and the patient consents to the risk of discharge as well as the risk of uncertainty in estimating outcomes. Given the symptoms and findings present at this time, the chance of SEA or SCC is so remote that additional testing or imaging is more likely to harm the patient than diagnose SEA. [id number]        The patient had significant improvement of symptoms with Toradol and Decadron here in the emergency department. I had a lengthy conversation with the patient regarding the likely etiology of her symptoms and recommendations for outpatient management and therapy. I strongly recommended she follow-up with her primary care physician as soon as possible.   She has been given contact information for primary care physicians accepting new patients. The patient will be prescribed a Medrol Dosepak as well as Naprosyn for analgesia. She is also given lidocaine patches for topical analgesia. I have again encouraged her to follow-up with an outpatient provider soon as possible. She is been encouraged to come back to the emergency department with any worsening symptoms    Current Code Status   Code Status: Not on file     Social Determinants of Health   Insurance:  Payor: Maria T Ion / Plan: [employer] / Product Type: *No Product type* /       Clinical Diagnoses Addressed  1. Acute midline low back pain, unspecified whether sciatica present          CONSULTS:  None    PROCEDURES:  Unless otherwise noted below, none     Procedures      FINAL IMPRESSION      1. Acute midline low back pain, unspecified whether sciatica present          DISPOSITION/PLAN   DISPOSITION Decision To Discharge 03/16/2023 02:01:29 PM      PATIENT REFERRED TO:      Schedule an appointment as soon as possible for a visit in 3 days  Follow up within 3 days, Return to ED sooner if symptoms worsen    DISCHARGE MEDICATIONS:  New Prescriptions    LIDOCAINE (LIDODERM) 5 %    Place 1 patch onto the skin every 24 hours Place 1 patch onto the skin daily 12 hours on, 12 hours off. METHYLPREDNISOLONE (MEDROL DOSEPACK) 4 MG TABLET    Take by mouth. NAPROXEN (NAPROSYN) 500 MG TABLET    Take 1 tablet by mouth 2 times daily as needed for Pain (with meals)     Controlled Substances Monitoring:     No flowsheet data found.     Becki Peña MD (electronically signed)  Attending Emergency Physician            Becki Peña MD  03/16/23 4534

## 2023-03-16 NOTE — ED NOTES
Pt sitting up on side of bed, states pain 6/10 but feels better to go home and rest     Akosua Keane, TRISTAN  03/16/23 7857

## 2023-03-16 NOTE — Clinical Note
Pritesh Bassett was seen and treated in our emergency department on 3/16/2023. She may return to work on 03/20/2023. If you have any questions or concerns, please don't hesitate to call.       Paolo Lou MD

## 2023-03-16 NOTE — DISCHARGE INSTRUCTIONS
Primary Care Physicians    [facility] MYRNA CHOWDARY Internal Medicine    Dr. Dominic Verdin. Aileen BAUMAN  [facility]  [address], [facility] 61  [address] [facility] Internal Med  [address] [facility] [address] [place] [address]  [phone]    [place]    Jaswant Suresh Internal Medicine [address] [facility] [address] [place] [address], [facility] 61  [address] and Peds. Haylee Dan MD  [address]  [address]. [place] [address]  [phone]    Patel Davis. Marleny [facility] CNP  [address]  [address]  Linda Suresh, [address] [facility]  [address] [facility] CNP   [address]  [address]  Linda Suresh, [address] [facility]  [phone]    Eddie Calderon MD  [address]  [address]. Linda Suresh [place] [address]  [phone]     Vipin Granda MD  [address]  [address]. Linda S[place] [address]  [phone]    Duane Aviles PA-C  [address]  [address]. Linda Suresh [facility] 61  [phone]    Primary Care Providers Linda Macdonadl MD  [address] , [address] [facility]  [phone]    Dr. Araceli Quan MD   [address] , [address] [facility]  [phone]    Primary Care Providers Dominick Castelan MD  [address]  [phone]       Deanne Ingram MD  [address], [address] [facility]  1325 [facility], [facility]  [address] [facility], [address] [facility]  [phone]       Internal Med    Rosalind Diez NP  [address], 1100 [facility] MD Luis Alberto  [address] [facility], [address]  [address] [facility]. Estephanie Newell 90  [address] Lala Litten., 22 [facility] [address]  [phone]  Same day and [facility]  care appointments  Mon.-Fri. 8 a.m.-8 p.m. Sat. 9 a.m.-1 p.m.

## 2024-09-01 ENCOUNTER — HOSPITAL ENCOUNTER (EMERGENCY)
Age: 59
Discharge: HOME | End: 2024-09-01
Attending: EMERGENCY MEDICINE
Payer: MEDICARE

## 2024-09-01 ENCOUNTER — APPOINTMENT (OUTPATIENT)
Dept: CT IMAGING | Age: 59
End: 2024-09-01
Attending: EMERGENCY MEDICINE
Payer: MEDICARE

## 2024-09-01 ENCOUNTER — APPOINTMENT (OUTPATIENT)
Dept: GENERAL RADIOLOGY | Age: 59
End: 2024-09-01
Attending: EMERGENCY MEDICINE
Payer: MEDICARE

## 2024-09-01 VITALS
BODY MASS INDEX: 28.79 KG/M2 | HEIGHT: 68 IN | WEIGHT: 190 LBS | RESPIRATION RATE: 18 BRPM | TEMPERATURE: 98.1 F | DIASTOLIC BLOOD PRESSURE: 74 MMHG | HEART RATE: 61 BPM | SYSTOLIC BLOOD PRESSURE: 147 MMHG | OXYGEN SATURATION: 99 %

## 2024-09-01 DIAGNOSIS — S32.000A COMPRESSION FRACTURE OF LUMBAR VERTEBRA, UNSPECIFIED LUMBAR VERTEBRAL LEVEL, INITIAL ENCOUNTER (HCC): ICD-10-CM

## 2024-09-01 DIAGNOSIS — R31.9 HEMATURIA, UNSPECIFIED TYPE: ICD-10-CM

## 2024-09-01 DIAGNOSIS — M54.9 BACK PAIN, UNSPECIFIED BACK LOCATION, UNSPECIFIED BACK PAIN LATERALITY, UNSPECIFIED CHRONICITY: Primary | ICD-10-CM

## 2024-09-01 LAB
ALBUMIN SERPL-MCNC: 3.8 GM/DL (ref 3.4–5)
ALP BLD-CCNC: 117 IU/L (ref 40–129)
ALT SERPL-CCNC: 5 U/L (ref 10–40)
ANION GAP SERPL CALCULATED.3IONS-SCNC: 11 MMOL/L (ref 7–16)
AST SERPL-CCNC: 14 IU/L (ref 15–37)
BACTERIA: ABNORMAL /HPF
BASOPHILS ABSOLUTE: 0.1 K/CU MM
BASOPHILS RELATIVE PERCENT: 0.8 % (ref 0–1)
BILIRUB SERPL-MCNC: 0.3 MG/DL (ref 0–1)
BILIRUBIN, URINE: NEGATIVE MG/DL
BLOOD, URINE: ABNORMAL
BUN SERPL-MCNC: 11 MG/DL (ref 6–23)
CALCIUM SERPL-MCNC: 9.1 MG/DL (ref 8.3–10.6)
CAST TYPE: ABNORMAL /HPF
CHLORIDE BLD-SCNC: 104 MMOL/L (ref 99–110)
CLARITY, UA: CLEAR
CO2: 23 MMOL/L (ref 21–32)
COLOR, UA: YELLOW
CREAT SERPL-MCNC: 0.6 MG/DL (ref 0.6–1.1)
CRYSTAL TYPE: NEGATIVE /HPF
DIFFERENTIAL TYPE: ABNORMAL
EOSINOPHILS ABSOLUTE: 0.1 K/CU MM
EOSINOPHILS RELATIVE PERCENT: 1 % (ref 0–3)
EPITHELIAL CELLS, UA: 4 /HPF
GFR, ESTIMATED: >90 ML/MIN/1.73M2
GLUCOSE SERPL-MCNC: 96 MG/DL (ref 70–99)
GLUCOSE URINE: NEGATIVE MG/DL
HCT VFR BLD CALC: 46 % (ref 37–47)
HEMOGLOBIN: 15.1 GM/DL (ref 12.5–16)
IMMATURE NEUTROPHIL %: 0.3 % (ref 0–0.43)
KETONES, URINE: NEGATIVE MG/DL
LEUKOCYTE ESTERASE, URINE: ABNORMAL
LYMPHOCYTES ABSOLUTE: 2.2 K/CU MM
LYMPHOCYTES RELATIVE PERCENT: 28.1 % (ref 24–44)
MCH RBC QN AUTO: 31.3 PG (ref 27–31)
MCHC RBC AUTO-ENTMCNC: 32.8 % (ref 32–36)
MCV RBC AUTO: 95.2 FL (ref 78–100)
MONOCYTES ABSOLUTE: 0.5 K/CU MM
MONOCYTES RELATIVE PERCENT: 6.6 % (ref 0–4)
NEUTROPHILS ABSOLUTE: 5 K/CU MM
NEUTROPHILS RELATIVE PERCENT: 63.2 % (ref 36–66)
NITRITE URINE, QUANTITATIVE: NEGATIVE
PDW BLD-RTO: 12.7 % (ref 11.7–14.9)
PH, URINE: 6 (ref 5–8)
PLATELET # BLD: 303 K/CU MM (ref 140–440)
PMV BLD AUTO: 9.6 FL (ref 7.5–11.1)
POTASSIUM SERPL-SCNC: 4.5 MMOL/L (ref 3.5–5.1)
PROTEIN UA: NEGATIVE MG/DL
RBC # BLD: 4.83 M/CU MM (ref 4.2–5.4)
RBC URINE: 6 /HPF (ref 0–6)
SODIUM BLD-SCNC: 138 MMOL/L (ref 135–145)
SPECIFIC GRAVITY UA: 1.01 (ref 1–1.03)
TOTAL IMMATURE NEUTOROPHIL: 0.02 K/CU MM
TOTAL PROTEIN: 6.6 GM/DL (ref 6.4–8.2)
UROBILINOGEN, URINE: 0.2 MG/DL (ref 0.2–1)
WBC # BLD: 7.9 K/CU MM (ref 4–10.5)
WBC UA: 5 /HPF (ref 0–5)

## 2024-09-01 PROCEDURE — 81001 URINALYSIS AUTO W/SCOPE: CPT

## 2024-09-01 PROCEDURE — 87086 URINE CULTURE/COLONY COUNT: CPT

## 2024-09-01 PROCEDURE — 96372 THER/PROPH/DIAG INJ SC/IM: CPT

## 2024-09-01 PROCEDURE — 72110 X-RAY EXAM L-2 SPINE 4/>VWS: CPT

## 2024-09-01 PROCEDURE — 74176 CT ABD & PELVIS W/O CONTRAST: CPT

## 2024-09-01 PROCEDURE — 80053 COMPREHEN METABOLIC PANEL: CPT

## 2024-09-01 PROCEDURE — 85025 COMPLETE CBC W/AUTO DIFF WBC: CPT

## 2024-09-01 PROCEDURE — 6370000000 HC RX 637 (ALT 250 FOR IP): Performed by: EMERGENCY MEDICINE

## 2024-09-01 PROCEDURE — 99284 EMERGENCY DEPT VISIT MOD MDM: CPT

## 2024-09-01 PROCEDURE — 6360000002 HC RX W HCPCS: Performed by: EMERGENCY MEDICINE

## 2024-09-01 RX ORDER — CYCLOBENZAPRINE HCL 10 MG
10 TABLET ORAL 3 TIMES DAILY PRN
Qty: 30 TABLET | Refills: 0 | Status: SHIPPED | OUTPATIENT
Start: 2024-09-01 | End: 2024-09-11

## 2024-09-01 RX ORDER — ACETAMINOPHEN 500 MG
1000 TABLET ORAL ONCE
Status: COMPLETED | OUTPATIENT
Start: 2024-09-01 | End: 2024-09-01

## 2024-09-01 RX ORDER — CYCLOBENZAPRINE HCL 10 MG
10 TABLET ORAL ONCE
Status: COMPLETED | OUTPATIENT
Start: 2024-09-01 | End: 2024-09-01

## 2024-09-01 RX ORDER — LIDOCAINE 4 G/G
1 PATCH TOPICAL DAILY
Qty: 30 PATCH | Refills: 0 | Status: SHIPPED | OUTPATIENT
Start: 2024-09-01 | End: 2024-10-01

## 2024-09-01 RX ORDER — ONDANSETRON 4 MG/1
4 TABLET, ORALLY DISINTEGRATING ORAL ONCE
Status: COMPLETED | OUTPATIENT
Start: 2024-09-01 | End: 2024-09-01

## 2024-09-01 RX ORDER — NAPROXEN 500 MG/1
500 TABLET ORAL 2 TIMES DAILY
Qty: 60 TABLET | Refills: 0 | Status: SHIPPED | OUTPATIENT
Start: 2024-09-01

## 2024-09-01 RX ORDER — LIDOCAINE 4 G/G
1 PATCH TOPICAL DAILY
Status: DISCONTINUED | OUTPATIENT
Start: 2024-09-01 | End: 2024-09-01 | Stop reason: HOSPADM

## 2024-09-01 RX ORDER — ACETAMINOPHEN 325 MG/1
650 TABLET ORAL EVERY 6 HOURS PRN
Qty: 120 TABLET | Refills: 3 | Status: SHIPPED | OUTPATIENT
Start: 2024-09-01

## 2024-09-01 RX ORDER — OXYCODONE HYDROCHLORIDE 5 MG/1
5 TABLET ORAL EVERY 4 HOURS PRN
Status: DISCONTINUED | OUTPATIENT
Start: 2024-09-01 | End: 2024-09-01 | Stop reason: HOSPADM

## 2024-09-01 RX ORDER — KETOROLAC TROMETHAMINE 30 MG/ML
30 INJECTION, SOLUTION INTRAMUSCULAR; INTRAVENOUS ONCE
Status: COMPLETED | OUTPATIENT
Start: 2024-09-01 | End: 2024-09-01

## 2024-09-01 RX ORDER — METHYLPREDNISOLONE 4 MG
TABLET, DOSE PACK ORAL
Qty: 1 KIT | Refills: 0 | Status: SHIPPED | OUTPATIENT
Start: 2024-09-01 | End: 2024-09-07

## 2024-09-01 RX ADMIN — CYCLOBENZAPRINE 10 MG: 10 TABLET, FILM COATED ORAL at 10:37

## 2024-09-01 RX ADMIN — ACETAMINOPHEN 1000 MG: 500 TABLET ORAL at 10:36

## 2024-09-01 RX ADMIN — ONDANSETRON 4 MG: 4 TABLET, ORALLY DISINTEGRATING ORAL at 10:36

## 2024-09-01 RX ADMIN — KETOROLAC TROMETHAMINE 30 MG: 30 INJECTION, SOLUTION INTRAMUSCULAR at 10:37

## 2024-09-01 RX ADMIN — OXYCODONE 5 MG: 5 TABLET ORAL at 10:46

## 2024-09-01 ASSESSMENT — PAIN DESCRIPTION - LOCATION: LOCATION: BACK

## 2024-09-01 ASSESSMENT — LIFESTYLE VARIABLES
HOW MANY STANDARD DRINKS CONTAINING ALCOHOL DO YOU HAVE ON A TYPICAL DAY: PATIENT DOES NOT DRINK
HOW OFTEN DO YOU HAVE A DRINK CONTAINING ALCOHOL: NEVER

## 2024-09-01 ASSESSMENT — PAIN DESCRIPTION - ORIENTATION: ORIENTATION: MID;LOWER

## 2024-09-01 NOTE — ED PROVIDER NOTES
Problems Mother     No Known Problems Father     No Known Problems Sister     No Known Problems Brother     No Known Problems Maternal Aunt     No Known Problems Maternal Uncle     No Known Problems Paternal Aunt     No Known Problems Paternal Uncle     No Known Problems Maternal Grandmother     No Known Problems Maternal Grandfather     No Known Problems Paternal Grandmother     No Known Problems Paternal Grandfather     No Known Problems Maternal Cousin     No Known Problems Paternal Cousin      Social History     Socioeconomic History    Marital status: Single     Spouse name: Not on file    Number of children: Not on file    Years of education: Not on file    Highest education level: Not on file   Occupational History    Not on file   Tobacco Use    Smoking status: Some Days     Types: Cigarettes    Smokeless tobacco: Never   Vaping Use    Vaping status: Never Used   Substance and Sexual Activity    Alcohol use: Not Currently    Drug use: Never    Sexual activity: Not on file   Other Topics Concern    Not on file   Social History Narrative    Not on file     Social Determinants of Health     Financial Resource Strain: Not on file   Food Insecurity: Not on file   Transportation Needs: Not on file   Physical Activity: Not on file   Stress: Not on file   Social Connections: Not on file   Intimate Partner Violence: Not on file   Housing Stability: Not on file     Current Facility-Administered Medications   Medication Dose Route Frequency Provider Last Rate Last Admin    lidocaine 4 % external patch 1 patch  1 patch TransDERmal Daily Santo Cobb DO   1 patch at 09/01/24 1036    oxyCODONE (ROXICODONE) immediate release tablet 5 mg  5 mg Oral Q4H PRN Santo Cobb DO   5 mg at 09/01/24 1046     Current Outpatient Medications   Medication Sig Dispense Refill    naproxen (NAPROSYN) 500 MG tablet Take 1 tablet by mouth 2 times daily 60 tablet 0    acetaminophen (TYLENOL) 325 MG tablet Take 2 tablets by mouth

## 2024-09-03 LAB
CULTURE: NORMAL
Lab: NORMAL
SPECIMEN: NORMAL

## 2025-01-02 ENCOUNTER — TRANSCRIBE ORDERS (OUTPATIENT)
Dept: ADMINISTRATIVE | Age: 60
End: 2025-01-02

## 2025-01-02 DIAGNOSIS — Z12.31 ENCOUNTER FOR SCREENING MAMMOGRAM FOR MALIGNANT NEOPLASM OF BREAST: Primary | ICD-10-CM

## 2025-01-02 DIAGNOSIS — Z12.39 ENCOUNTER FOR OTHER SCREENING FOR MALIGNANT NEOPLASM OF BREAST: ICD-10-CM

## 2025-01-03 SDOH — HEALTH STABILITY: PHYSICAL HEALTH: ON AVERAGE, HOW MANY DAYS PER WEEK DO YOU ENGAGE IN MODERATE TO STRENUOUS EXERCISE (LIKE A BRISK WALK)?: 0 DAYS

## 2025-01-03 SDOH — HEALTH STABILITY: PHYSICAL HEALTH: ON AVERAGE, HOW MANY MINUTES DO YOU ENGAGE IN EXERCISE AT THIS LEVEL?: 0 MIN

## 2025-02-12 ENCOUNTER — OFFICE VISIT (OUTPATIENT)
Age: 60
End: 2025-02-12

## 2025-02-12 VITALS
BODY MASS INDEX: 30.49 KG/M2 | RESPIRATION RATE: 18 BRPM | SYSTOLIC BLOOD PRESSURE: 128 MMHG | OXYGEN SATURATION: 97 % | WEIGHT: 201.2 LBS | DIASTOLIC BLOOD PRESSURE: 76 MMHG | HEIGHT: 68 IN | HEART RATE: 78 BPM

## 2025-02-12 DIAGNOSIS — Z13.220 LIPID SCREENING: ICD-10-CM

## 2025-02-12 DIAGNOSIS — Z11.59 NEED FOR HEPATITIS C SCREENING TEST: ICD-10-CM

## 2025-02-12 DIAGNOSIS — S32.038D OTHER CLOSED FRACTURE OF THIRD LUMBAR VERTEBRA WITH ROUTINE HEALING, SUBSEQUENT ENCOUNTER: Primary | ICD-10-CM

## 2025-02-12 DIAGNOSIS — F39 MOOD DISORDER (HCC): ICD-10-CM

## 2025-02-12 DIAGNOSIS — H93.13 BILATERAL TINNITUS: ICD-10-CM

## 2025-02-12 DIAGNOSIS — Z11.4 SCREENING FOR HIV (HUMAN IMMUNODEFICIENCY VIRUS): ICD-10-CM

## 2025-02-12 RX ORDER — CYCLOBENZAPRINE HCL 5 MG
5 TABLET ORAL NIGHTLY PRN
Qty: 14 TABLET | Refills: 0 | Status: SHIPPED | OUTPATIENT
Start: 2025-02-12 | End: 2025-02-26

## 2025-02-12 RX ORDER — MELOXICAM 15 MG/1
15 TABLET ORAL DAILY
Qty: 14 TABLET | Refills: 0 | Status: SHIPPED | OUTPATIENT
Start: 2025-02-12 | End: 2025-02-26

## 2025-02-12 SDOH — ECONOMIC STABILITY: FOOD INSECURITY: WITHIN THE PAST 12 MONTHS, YOU WORRIED THAT YOUR FOOD WOULD RUN OUT BEFORE YOU GOT MONEY TO BUY MORE.: NEVER TRUE

## 2025-02-12 SDOH — ECONOMIC STABILITY: FOOD INSECURITY: WITHIN THE PAST 12 MONTHS, THE FOOD YOU BOUGHT JUST DIDN'T LAST AND YOU DIDN'T HAVE MONEY TO GET MORE.: NEVER TRUE

## 2025-02-12 ASSESSMENT — PATIENT HEALTH QUESTIONNAIRE - PHQ9
SUM OF ALL RESPONSES TO PHQ QUESTIONS 1-9: 0
SUM OF ALL RESPONSES TO PHQ9 QUESTIONS 1 & 2: 0
1. LITTLE INTEREST OR PLEASURE IN DOING THINGS: NOT AT ALL
2. FEELING DOWN, DEPRESSED OR HOPELESS: NOT AT ALL

## 2025-02-12 NOTE — PROGRESS NOTES
[facility]  [address]  [facility] [address]  Dept: [phone]  Dept Fax: [phone]  Loc: [phone]      Visit type: New patient    Encounter Start Time: 9:37 AM EST  Encounter End Time: 10:20 AM EST     100% of the time was spent with the patient today, discussing their symptoms, conducting an examination, reviewing the patient's diagnostic test results, and counseling    Reason for Visit: New Patient (To establish care with provider /Ringing in bilateral ears /Weight /Pain back / legs )      Assessment and Plan         Assessment & Plan  Bilateral tinnitus  Uncontrolled  The patient's tinnitus may be attributed to age-related changes or the use of peroxide for ear cleaning.  - Blood work will be conducted prior to initiating SSRI treatment.  - A prescription for Prozac 20 mg will be provided to manage her tinnitus symptoms, pending blood work.  - She was advised to dilute the peroxide with 50% water for weekly ear cleaning.  - She was also informed about the availability of a spray bottle for ear cleaning.  - The potential side effects of Prozac were discussed.    Generalized anxiety disorder  Uncontrolled  The patient meets the criteria for generalized anxiety disorder.  - Blood work will be conducted prior to initiating SSRI treatment.  - A prescription for Prozac 20 mg will be provided to manage her anxiety symptoms, pending blood work.  - The potential side effects of Prozac were discussed.  - If her symptoms improve but are not completely resolved after 6 to 8 weeks, the dosage of Prozac will be increased.  - Once her symptoms are fully managed, the medication will be continued for 6 months before considering discontinuation.    Chronic pain  Uncontrolled  The patient experiences chronic pain, particularly in her back and wrist.  - A prescription for Mobic 15 mg was provided to manage her pain.  - She was advised to take Tylenol 1000 mg as needed for pain

## 2025-02-12 NOTE — PATIENT INSTRUCTIONS
happen.            Welcome to [facility]:    Did you know we now have a faster way for you to move through your appointment? For your convenience, we now have digital registration available. When you schedule your next appointment, you will receive a link via your email as well as a text message that will allow you to complete any paperwork digitally before your appointment.

## 2025-02-13 LAB
ALBUMIN SERPL-MCNC: 4.3 G/DL (ref 3.4–5)
ALBUMIN/GLOB SERPL: 1.6 {RATIO} (ref 1.1–2.2)
ALP SERPL-CCNC: 130 U/L (ref 40–129)
ALT SERPL-CCNC: 14 U/L (ref 10–40)
ANION GAP SERPL CALCULATED.3IONS-SCNC: 10 MMOL/L (ref 3–16)
AST SERPL-CCNC: 19 U/L (ref 15–37)
BASOPHILS # BLD: 0.1 K/UL (ref 0–0.2)
BASOPHILS NFR BLD: 1.4 %
BILIRUB SERPL-MCNC: 0.3 MG/DL (ref 0–1)
BUN SERPL-MCNC: 12 MG/DL (ref 7–20)
CALCIUM SERPL-MCNC: 10 MG/DL (ref 8.3–10.6)
CHLORIDE SERPL-SCNC: 104 MMOL/L (ref 99–110)
CHOLEST SERPL-MCNC: 244 MG/DL (ref 0–199)
CO2 SERPL-SCNC: 26 MMOL/L (ref 21–32)
CREAT SERPL-MCNC: 0.8 MG/DL (ref 0.6–1.2)
DEPRECATED RDW RBC AUTO: 13.2 % (ref 12.4–15.4)
EOSINOPHIL # BLD: 0 K/UL (ref 0–0.6)
EOSINOPHIL NFR BLD: 0.7 %
GFR SERPLBLD CREATININE-BSD FMLA CKD-EPI: 83 ML/MIN/{1.73_M2}
GLUCOSE SERPL-MCNC: 91 MG/DL (ref 70–99)
HCT VFR BLD AUTO: 46.6 % (ref 36–48)
HCV AB SERPL QL IA: NORMAL
HDLC SERPL-MCNC: 74 MG/DL (ref 40–60)
HGB BLD-MCNC: 15.9 G/DL (ref 12–16)
HIV 1+2 AB+HIV1 P24 AG SERPL QL IA: NORMAL
HIV 2 AB SERPL QL IA: NORMAL
HIV1 AB SERPL QL IA: NORMAL
HIV1 P24 AG SERPL QL IA: NORMAL
LDLC SERPL CALC-MCNC: 147 MG/DL
LYMPHOCYTES # BLD: 2.2 K/UL (ref 1–5.1)
LYMPHOCYTES NFR BLD: 32.5 %
MCH RBC QN AUTO: 32 PG (ref 26–34)
MCHC RBC AUTO-ENTMCNC: 34.2 G/DL (ref 31–36)
MCV RBC AUTO: 93.6 FL (ref 80–100)
MONOCYTES # BLD: 0.5 K/UL (ref 0–1.3)
MONOCYTES NFR BLD: 7.2 %
NEUTROPHILS # BLD: 4 K/UL (ref 1.7–7.7)
NEUTROPHILS NFR BLD: 58.2 %
PLATELET # BLD AUTO: 324 K/UL (ref 135–450)
PMV BLD AUTO: 8.9 FL (ref 5–10.5)
POTASSIUM SERPL-SCNC: 4.6 MMOL/L (ref 3.5–5.1)
PROT SERPL-MCNC: 7 G/DL (ref 6.4–8.2)
RBC # BLD AUTO: 4.98 M/UL (ref 4–5.2)
SODIUM SERPL-SCNC: 140 MMOL/L (ref 136–145)
TRIGL SERPL-MCNC: 116 MG/DL (ref 0–150)
TSH SERPL DL<=0.005 MIU/L-ACNC: 1.42 UIU/ML (ref 0.27–4.2)
VLDLC SERPL CALC-MCNC: 23 MG/DL
WBC # BLD AUTO: 6.9 K/UL (ref 4–11)

## 2025-02-14 ENCOUNTER — TELEPHONE (OUTPATIENT)
Age: 60
End: 2025-02-14

## 2025-02-14 NOTE — TELEPHONE ENCOUNTER
Pt. Called regarding labs pt. Is still having ringing in the ears. Pt. States pt. Had discussed possibly prescribing prozac pt. Would like to know if this is sill a option. Please advise.

## 2025-03-06 ENCOUNTER — TELEPHONE (OUTPATIENT)
Age: 60
End: 2025-03-06

## 2025-03-06 NOTE — TELEPHONE ENCOUNTER
Pt. Phoned office again today. Prozac has not been sent to the pharmacy. Pt. Would like Prozac sent to [facility].

## 2025-03-12 ENCOUNTER — COMMUNITY OUTREACH (OUTPATIENT)
Age: 60
End: 2025-03-12

## 2025-04-15 ENCOUNTER — OFFICE VISIT (OUTPATIENT)
Age: 60
End: 2025-04-15
Payer: MEDICARE

## 2025-04-15 VITALS
WEIGHT: 197.8 LBS | BODY MASS INDEX: 30.08 KG/M2 | SYSTOLIC BLOOD PRESSURE: 112 MMHG | DIASTOLIC BLOOD PRESSURE: 72 MMHG | HEART RATE: 73 BPM | RESPIRATION RATE: 20 BRPM | OXYGEN SATURATION: 97 %

## 2025-04-15 DIAGNOSIS — F41.1 GAD (GENERALIZED ANXIETY DISORDER): ICD-10-CM

## 2025-04-15 DIAGNOSIS — H93.13 BILATERAL TINNITUS: ICD-10-CM

## 2025-04-15 DIAGNOSIS — S32.038D OTHER CLOSED FRACTURE OF THIRD LUMBAR VERTEBRA WITH ROUTINE HEALING, SUBSEQUENT ENCOUNTER: Primary | ICD-10-CM

## 2025-04-15 PROCEDURE — G8417 CALC BMI ABV UP PARAM F/U: HCPCS

## 2025-04-15 PROCEDURE — G8427 DOCREV CUR MEDS BY ELIG CLIN: HCPCS

## 2025-04-15 PROCEDURE — G2211 COMPLEX E/M VISIT ADD ON: HCPCS

## 2025-04-15 PROCEDURE — 4004F PT TOBACCO SCREEN RCVD TLK: CPT

## 2025-04-15 PROCEDURE — 99214 OFFICE O/P EST MOD 30 MIN: CPT

## 2025-04-15 PROCEDURE — 3017F COLORECTAL CA SCREEN DOC REV: CPT

## 2025-04-15 RX ORDER — IBUPROFEN 200 MG
200 TABLET ORAL EVERY 6 HOURS PRN
COMMUNITY

## 2025-04-15 RX ORDER — DULOXETIN HYDROCHLORIDE 20 MG/1
20 CAPSULE, DELAYED RELEASE ORAL DAILY
Qty: 30 CAPSULE | Refills: 3 | Status: SHIPPED | OUTPATIENT
Start: 2025-04-15

## 2025-04-15 NOTE — PROGRESS NOTES
other individuals in attendance at the appointment consented to the use of AI, including the recording.

## 2025-05-16 ENCOUNTER — OFFICE VISIT (OUTPATIENT)
Age: 60
End: 2025-05-16
Payer: MEDICARE

## 2025-05-16 VITALS
RESPIRATION RATE: 20 BRPM | DIASTOLIC BLOOD PRESSURE: 80 MMHG | WEIGHT: 195.8 LBS | HEART RATE: 68 BPM | SYSTOLIC BLOOD PRESSURE: 138 MMHG | BODY MASS INDEX: 29.77 KG/M2 | OXYGEN SATURATION: 96 %

## 2025-05-16 DIAGNOSIS — F41.1 GAD (GENERALIZED ANXIETY DISORDER): Primary | ICD-10-CM

## 2025-05-16 DIAGNOSIS — H93.13 BILATERAL TINNITUS: ICD-10-CM

## 2025-05-16 PROCEDURE — 4004F PT TOBACCO SCREEN RCVD TLK: CPT

## 2025-05-16 PROCEDURE — G2211 COMPLEX E/M VISIT ADD ON: HCPCS

## 2025-05-16 PROCEDURE — G8417 CALC BMI ABV UP PARAM F/U: HCPCS

## 2025-05-16 PROCEDURE — 99214 OFFICE O/P EST MOD 30 MIN: CPT

## 2025-05-16 PROCEDURE — 3017F COLORECTAL CA SCREEN DOC REV: CPT

## 2025-05-16 PROCEDURE — G8427 DOCREV CUR MEDS BY ELIG CLIN: HCPCS

## 2025-05-16 RX ORDER — HYDROXYZINE PAMOATE 25 MG/1
25 CAPSULE ORAL 4 TIMES DAILY PRN
Qty: 30 CAPSULE | Refills: 0 | Status: SHIPPED | OUTPATIENT
Start: 2025-05-16 | End: 2025-06-15

## 2025-05-16 NOTE — PROGRESS NOTES
doctor when she has three doses left so a refill can be provided.  - Emphasized the importance of stretching and exercises to alleviate muscle pain.    Follow-up  - The patient will follow up in 2 months for an annual wellness visit.    No orders of the defined types were placed in this encounter.    Orders Placed This Encounter   Medications    hydrOXYzine pamoate (VISTARIL) 25 MG capsule     Sig: Take 1 capsule by mouth 4 times daily as needed for Anxiety     Dispense:  30 capsule     Refill:  0         Patient was involved and agreeable in shared decision making.  Information about different treatment options including side effects discussed with patient.  Any information requested was supplied.    Return in about 8 weeks (around 7/11/2025) for AWV.      Subjective     Chayito Bustamante is a 60 y.o. female is here for:   Chief Complaint   Patient presents with    Follow-up     Mood last medication Cymbalta made stomach hurt and gave pt. Diarrhea. Fluoxetine made pt. Angry. Pt. Is coming down to last 6 of pain medication and would like a refill.         History of Present Illness  The patient is a 60-year-old female who presents for a mood follow-up.    She reports an adverse reaction to Cymbalta, which she describes as severe. She experienced significant abdominal discomfort and diarrhea, leading her to discontinue the medication. She expresses reluctance to try alternative medications due to fear of similar reactions. Previous antidepressants have induced feelings of anger and irritability. She has been diagnosed with tinnitus and continues to experience symptoms despite treatment. She has previously undergone cognitive behavioral therapy (CBT) without significant improvement. Her anxiety levels have improved compared to previous years, attributing this to her efforts in confronting anxiety-inducing situations. She is not currently taking hydroxyzine, as it was not filled at the pharmacy. She does not endorse any

## 2025-07-15 ENCOUNTER — HOSPITAL ENCOUNTER (EMERGENCY)
Age: 60
Discharge: HOME | End: 2025-07-15
Attending: EMERGENCY MEDICINE
Payer: MEDICARE

## 2025-07-15 VITALS
BODY MASS INDEX: 29.38 KG/M2 | SYSTOLIC BLOOD PRESSURE: 147 MMHG | HEART RATE: 72 BPM | HEIGHT: 69 IN | OXYGEN SATURATION: 97 % | TEMPERATURE: 97.7 F | WEIGHT: 198.4 LBS | RESPIRATION RATE: 18 BRPM | DIASTOLIC BLOOD PRESSURE: 75 MMHG

## 2025-07-15 DIAGNOSIS — M54.50 ACUTE EXACERBATION OF CHRONIC LOW BACK PAIN: Primary | ICD-10-CM

## 2025-07-15 DIAGNOSIS — G89.29 ACUTE EXACERBATION OF CHRONIC LOW BACK PAIN: Primary | ICD-10-CM

## 2025-07-15 PROCEDURE — 6360000002 HC RX W HCPCS: Performed by: EMERGENCY MEDICINE

## 2025-07-15 PROCEDURE — 6370000000 HC RX 637 (ALT 250 FOR IP): Performed by: EMERGENCY MEDICINE

## 2025-07-15 PROCEDURE — 96372 THER/PROPH/DIAG INJ SC/IM: CPT

## 2025-07-15 PROCEDURE — 99284 EMERGENCY DEPT VISIT MOD MDM: CPT

## 2025-07-15 RX ORDER — KETOROLAC TROMETHAMINE 10 MG/1
10 TABLET, FILM COATED ORAL EVERY 6 HOURS PRN
Qty: 20 TABLET | Refills: 0 | Status: SHIPPED | OUTPATIENT
Start: 2025-07-15

## 2025-07-15 RX ORDER — KETOROLAC TROMETHAMINE 30 MG/ML
30 INJECTION, SOLUTION INTRAMUSCULAR; INTRAVENOUS ONCE
Status: COMPLETED | OUTPATIENT
Start: 2025-07-15 | End: 2025-07-15

## 2025-07-15 RX ADMIN — KETOROLAC TROMETHAMINE 30 MG: 30 INJECTION, SOLUTION INTRAMUSCULAR at 14:52

## 2025-07-15 RX ADMIN — TIZANIDINE 4 MG: 4 TABLET ORAL at 14:52

## 2025-07-15 ASSESSMENT — PAIN DESCRIPTION - ORIENTATION: ORIENTATION: LEFT;LOWER

## 2025-07-15 ASSESSMENT — PAIN - FUNCTIONAL ASSESSMENT
PAIN_FUNCTIONAL_ASSESSMENT: 0-10
PAIN_FUNCTIONAL_ASSESSMENT: PREVENTS OR INTERFERES WITH ALL ACTIVE AND SOME PASSIVE ACTIVITIES

## 2025-07-15 ASSESSMENT — PAIN SCALES - GENERAL: PAINLEVEL_OUTOF10: 10

## 2025-07-15 ASSESSMENT — PAIN DESCRIPTION - LOCATION: LOCATION: BACK

## 2025-07-15 ASSESSMENT — ENCOUNTER SYMPTOMS: BACK PAIN: 1

## 2025-07-15 ASSESSMENT — PAIN DESCRIPTION - PAIN TYPE: TYPE: ACUTE PAIN;CHRONIC PAIN

## 2025-07-15 ASSESSMENT — PAIN DESCRIPTION - DESCRIPTORS: DESCRIPTORS: OTHER (COMMENT)

## 2025-07-15 NOTE — ED PROVIDER NOTES
file    Years of education: Not on file    Highest education level: Not on file   Occupational History    Not on file   Tobacco Use    Smoking status: Every Day     Current packs/day: 0.50     Average packs/day: 0.5 packs/day for 29.5 years (14.8 ttl pk-yrs)     Types: Cigarettes     Start date: 1996    Smokeless tobacco: Never   Vaping Use    Vaping status: Never Used   Substance and Sexual Activity    Alcohol use: Not Currently    Drug use: Not Currently    Sexual activity: Not Currently     Partners: Male   Other Topics Concern    Not on file   Social History Narrative    Not on file     Social Drivers of Health     Financial Resource Strain: Not on file   Food Insecurity: No Food Insecurity (2/12/2025)    Hunger Vital Sign     Worried About Running Out of Food in the Last Year: Never true     Ran Out of Food in the Last Year: Never true   Transportation Needs: No Transportation Needs (2/12/2025)    PRAPARE - Transportation     Lack of Transportation (Medical): No     Lack of Transportation (Non-Medical): No   Physical Activity: Inactive (1/3/2025)    Exercise Vital Sign     Days of Exercise per Week: 0 days     Minutes of Exercise per Session: 0 min   Stress: Not on file   Social Connections: Not on file   Intimate Partner Violence: Not on file   Housing Stability: Low Risk  (2/12/2025)    Housing Stability Vital Sign     Unable to Pay for Housing in the Last Year: No     Number of Times Moved in the Last Year: 0     Homeless in the Last Year: No     Current Facility-Administered Medications   Medication Dose Route Frequency Provider Last Rate Last Admin    ketorolac (TORADOL) injection 30 mg  30 mg IntraMUSCular Once Carlos Stevenson,         tiZANidine (ZANAFLEX) tablet 4 mg  4 mg Oral Once Carlos Stevenson, DO         Current Outpatient Medications   Medication Sig Dispense Refill    tiZANidine (ZANAFLEX) 4 MG tablet Take 1 tablet by mouth 3 times daily as needed (pain / spams) 30 tablet 0    ketorolac

## 2025-07-16 ENCOUNTER — OFFICE VISIT (OUTPATIENT)
Age: 60
End: 2025-07-16

## 2025-07-16 VITALS
DIASTOLIC BLOOD PRESSURE: 78 MMHG | SYSTOLIC BLOOD PRESSURE: 132 MMHG | BODY MASS INDEX: 30.09 KG/M2 | OXYGEN SATURATION: 97 % | HEART RATE: 69 BPM | RESPIRATION RATE: 18 BRPM | WEIGHT: 200.8 LBS

## 2025-07-16 DIAGNOSIS — S39.012A STRAIN OF LUMBAR REGION, INITIAL ENCOUNTER: ICD-10-CM

## 2025-07-16 DIAGNOSIS — H93.13 BILATERAL TINNITUS: ICD-10-CM

## 2025-07-16 DIAGNOSIS — Z12.11 ENCOUNTER FOR SCREENING COLONOSCOPY: Primary | ICD-10-CM

## 2025-07-16 NOTE — PROGRESS NOTES
[facility]  [address]  [facility] [address]  Dept: [phone]  Dept Fax: [phone]  Loc: [phone]      Visit type: Established patient    Encounter Start Time: 9:52 AM EDT  Encounter End Time: 10:20 AM EDT     100% of the time was spent with the patient today, discussing their symptoms, conducting an examination, reviewing the patient's diagnostic test results, and counseling    Reason for Visit: Follow-up (For mood. Went to ER yesterday for back injury. Still having pain.)      Assessment and Plan         Assessment & Plan  Tinnitus  Uncontrolled  Reports persistent ringing in her ears.  DIAGNOSTIC STUDIES:  - Referral to an ENT specialist made for further evaluation  TREATMENT:  - Various treatment options discussed, including vestibular therapy, tinnitus training therapy, sound therapy, and transcutaneous electrical nerve stimulation (TENS)  - Tried sound therapy at home without success    Lumbar strain  Uncontrolled  Experienced acute back pain after pulling a plane out of the ground, leading to an emergency room visit. Pain identified as a lumbar strain; myofascial manipulation performed during the visit provided some relief.  TREATMENT:  - Myofascial manipulation performed for 15 minutes with significant improvement  - Advised to continue stretching exercises, apply ice initially, and then use heat at the end of the day  - Voltaren gel cream (diclofenac) recommended for application up to four times daily  - Instructed to hold each stretch for one minute and perform stretches first thing in the morning and before bed    Health maintenance  Stable  Declined breast cancer screening and colon cancer screening but agreed to Cologuard testing.  TREATMENT:  - Advised to use sunscreen regularly, especially when swimming in her inground pool  FUTURE PLANS:  - Medicare annual wellness visit scheduled for 11/2025    Orders Placed This Encounter   Procedures    Fecal

## 2025-07-29 LAB — NONINV COLON CA DNA+OCC BLD SCRN STL QL: NEGATIVE

## (undated) DEVICE — TOWEL,OR,DSP,ST,BLUE,STD,6/PK,12PK/CS: Brand: MEDLINE

## (undated) DEVICE — GLOVE SURG SZ 65 THK91MIL LTX FREE SYN POLYISOPRENE

## (undated) DEVICE — SET EXTN L41IN 2 NDL FREE VALVES FREE INJ PRT

## (undated) DEVICE — SOLUTION IRRIG 250ML STRL H2O PLAS POUR BTL USP

## (undated) DEVICE — BLADE OPHTH 180DEG CUT SURF BLU STR SHRP DBL BVL GRINDLESS

## (undated) DEVICE — GLOVE ORANGE PI 7   MSG9070

## (undated) DEVICE — MICROSURGICAL INSTRUMENT RETROBULAR NEEDLE 25GA X 38MM (1 1/2 IN): Brand: ALCON

## (undated) DEVICE — ELECTRODE,RADIOTRANSLUCENT,FOAM,5PK: Brand: MEDLINE

## (undated) DEVICE — HYPODERMIC SAFETY NEEDLE: Brand: MAGELLAN

## (undated) DEVICE — SET ADMIN L105IN 10GTT 3 NDL FREE CK VLV 2 PC M LUERLOCK

## (undated) DEVICE — GLOVE ORANGE PI 7 1/2   MSG9075

## (undated) DEVICE — Device

## (undated) DEVICE — NEEDLE HYPO 23GA L1.5IN TURQ POLYPR HUB S STL THN WALL IM

## (undated) DEVICE — LINE SAMP O2 6.5FT W/FEMALE CONN F/ADULT CAPNOLINE PLUS